# Patient Record
Sex: MALE | Race: WHITE | NOT HISPANIC OR LATINO | URBAN - METROPOLITAN AREA
[De-identification: names, ages, dates, MRNs, and addresses within clinical notes are randomized per-mention and may not be internally consistent; named-entity substitution may affect disease eponyms.]

---

## 2017-11-20 ENCOUNTER — OUTPATIENT (OUTPATIENT)
Dept: OUTPATIENT SERVICES | Facility: HOSPITAL | Age: 67
LOS: 1 days | End: 2017-11-20
Payer: MEDICARE

## 2017-11-20 VITALS
OXYGEN SATURATION: 96 % | HEART RATE: 78 BPM | WEIGHT: 250 LBS | RESPIRATION RATE: 18 BRPM | SYSTOLIC BLOOD PRESSURE: 155 MMHG | DIASTOLIC BLOOD PRESSURE: 93 MMHG | HEIGHT: 68 IN | TEMPERATURE: 98 F

## 2017-11-20 DIAGNOSIS — R94.39 ABNORMAL RESULT OF OTHER CARDIOVASCULAR FUNCTION STUDY: ICD-10-CM

## 2017-11-20 DIAGNOSIS — Z90.89 ACQUIRED ABSENCE OF OTHER ORGANS: Chronic | ICD-10-CM

## 2017-11-20 DIAGNOSIS — Z01.810 ENCOUNTER FOR PREPROCEDURAL CARDIOVASCULAR EXAMINATION: ICD-10-CM

## 2017-11-20 LAB
ALBUMIN SERPL ELPH-MCNC: 4.2 G/DL — SIGNIFICANT CHANGE UP (ref 3.3–5.2)
ALP SERPL-CCNC: 84 U/L — SIGNIFICANT CHANGE UP (ref 40–120)
ALT FLD-CCNC: 28 U/L — SIGNIFICANT CHANGE UP
ANION GAP SERPL CALC-SCNC: 14 MMOL/L — SIGNIFICANT CHANGE UP (ref 5–17)
APTT BLD: 29.2 SEC — SIGNIFICANT CHANGE UP (ref 27.5–37.4)
AST SERPL-CCNC: 30 U/L — SIGNIFICANT CHANGE UP
BILIRUB SERPL-MCNC: 0.4 MG/DL — SIGNIFICANT CHANGE UP (ref 0.4–2)
BUN SERPL-MCNC: 13 MG/DL — SIGNIFICANT CHANGE UP (ref 8–20)
CALCIUM SERPL-MCNC: 9.4 MG/DL — SIGNIFICANT CHANGE UP (ref 8.6–10.2)
CHLORIDE SERPL-SCNC: 102 MMOL/L — SIGNIFICANT CHANGE UP (ref 98–107)
CO2 SERPL-SCNC: 25 MMOL/L — SIGNIFICANT CHANGE UP (ref 22–29)
CREAT SERPL-MCNC: 1.05 MG/DL — SIGNIFICANT CHANGE UP (ref 0.5–1.3)
GLUCOSE SERPL-MCNC: 107 MG/DL — SIGNIFICANT CHANGE UP (ref 70–115)
HCT VFR BLD CALC: 46.2 % — SIGNIFICANT CHANGE UP (ref 42–52)
HCV AB S/CO SERPL IA: 0.28 S/CO — SIGNIFICANT CHANGE UP
HCV AB SERPL-IMP: SIGNIFICANT CHANGE UP
HGB BLD-MCNC: 15.4 G/DL — SIGNIFICANT CHANGE UP (ref 14–18)
HIV 1 & 2 AB SERPL IA.RAPID: SIGNIFICANT CHANGE UP
INR BLD: 0.96 RATIO — SIGNIFICANT CHANGE UP (ref 0.88–1.16)
MCHC RBC-ENTMCNC: 29.4 PG — SIGNIFICANT CHANGE UP (ref 27–31)
MCHC RBC-ENTMCNC: 33.3 G/DL — SIGNIFICANT CHANGE UP (ref 32–36)
MCV RBC AUTO: 88.2 FL — SIGNIFICANT CHANGE UP (ref 80–94)
PLATELET # BLD AUTO: 261 K/UL — SIGNIFICANT CHANGE UP (ref 150–400)
POTASSIUM SERPL-MCNC: 4.2 MMOL/L — SIGNIFICANT CHANGE UP (ref 3.5–5.3)
POTASSIUM SERPL-SCNC: 4.2 MMOL/L — SIGNIFICANT CHANGE UP (ref 3.5–5.3)
PROT SERPL-MCNC: 7.4 G/DL — SIGNIFICANT CHANGE UP (ref 6.6–8.7)
PROTHROM AB SERPL-ACNC: 10.6 SEC — SIGNIFICANT CHANGE UP (ref 9.8–12.7)
RBC # BLD: 5.24 M/UL — SIGNIFICANT CHANGE UP (ref 4.6–6.2)
RBC # FLD: 13.8 % — SIGNIFICANT CHANGE UP (ref 11–15.6)
SODIUM SERPL-SCNC: 141 MMOL/L — SIGNIFICANT CHANGE UP (ref 135–145)
WBC # BLD: 7.6 K/UL — SIGNIFICANT CHANGE UP (ref 4.8–10.8)
WBC # FLD AUTO: 7.6 K/UL — SIGNIFICANT CHANGE UP (ref 4.8–10.8)

## 2017-11-20 PROCEDURE — 93010 ELECTROCARDIOGRAM REPORT: CPT

## 2017-11-20 NOTE — ASU PATIENT PROFILE, ADULT - PSH
Deviated septum  repaired surgically  H/O left inguinal hernia repair    H/O right knee surgery    H/O umbilical hernia repair    History of cataract surgery    History of tonsillectomy    S/P angioplasty with stent  LAD

## 2017-11-20 NOTE — H&P PST ADULT - PMH
Abnormal stress test    CAD (coronary artery disease)    Detached retina, bilateral  repaired  Diverticulosis    GERD (gastroesophageal reflux disease)  pharyngeal reflux, gastro-esophageal reflux  without esophagitis  Glaucoma  open angle  Hiatal hernia    HLD (hyperlipidemia)    Skin cancer    Sleep apnea  on  cpap  setting  15 Abnormal stress test    Asthma    CAD (coronary artery disease)    Detached retina, bilateral  repaired  Diverticulosis    GERD (gastroesophageal reflux disease)  pharyngeal reflux, gastro-esophageal reflux  without esophagitis  Glaucoma  open angle  Hiatal hernia    HLD (hyperlipidemia)    Skin cancer    Sleep apnea  on  cpap  setting  15

## 2017-11-20 NOTE — H&P PST ADULT - HISTORY OF PRESENT ILLNESS
67 year old male with h/o HTN, HLD and CAD s/p PAD stent 2005 and again in 2008 after occlusion.  Repeat cardiac cath in 2009 revealed patent stent with 80-90% diagonal branch occlusion treated medically.  Pt with c/o CESAR with positive stress showing severe anterior and apical ischemia.

## 2017-11-20 NOTE — ASU PATIENT PROFILE, ADULT - PMH
CAD (coronary artery disease)    Detached retina, bilateral  repaired  Diverticulosis    GERD (gastroesophageal reflux disease)  pharyngeal reflux, gastro-esophageal reflux  without esophagitis  Glaucoma  open angle  Hiatal hernia    HLD (hyperlipidemia)    Skin cancer    Sleep apnea  on  cpap  setting  15

## 2017-11-21 ENCOUNTER — INPATIENT (INPATIENT)
Facility: HOSPITAL | Age: 67
LOS: 0 days | Discharge: HOME | DRG: 247 | End: 2017-11-22
Attending: EMERGENCY MEDICINE | Admitting: EMERGENCY MEDICINE
Payer: COMMERCIAL

## 2017-11-21 ENCOUNTER — TRANSCRIPTION ENCOUNTER (OUTPATIENT)
Age: 67
End: 2017-11-21

## 2017-11-21 VITALS
TEMPERATURE: 98 F | OXYGEN SATURATION: 96 % | HEART RATE: 95 BPM | RESPIRATION RATE: 18 BRPM | SYSTOLIC BLOOD PRESSURE: 161 MMHG | DIASTOLIC BLOOD PRESSURE: 80 MMHG

## 2017-11-21 DIAGNOSIS — Z01.810 ENCOUNTER FOR PREPROCEDURAL CARDIOVASCULAR EXAMINATION: ICD-10-CM

## 2017-11-21 DIAGNOSIS — Z90.89 ACQUIRED ABSENCE OF OTHER ORGANS: Chronic | ICD-10-CM

## 2017-11-21 DIAGNOSIS — I25.10 ATHEROSCLEROTIC HEART DISEASE OF NATIVE CORONARY ARTERY WITHOUT ANGINA PECTORIS: ICD-10-CM

## 2017-11-21 DIAGNOSIS — R94.39 ABNORMAL RESULT OF OTHER CARDIOVASCULAR FUNCTION STUDY: ICD-10-CM

## 2017-11-21 LAB
BLD GP AB SCN SERPL QL: SIGNIFICANT CHANGE UP
TYPE + AB SCN PNL BLD: SIGNIFICANT CHANGE UP

## 2017-11-21 RX ORDER — TAMSULOSIN HYDROCHLORIDE 0.4 MG/1
0.4 CAPSULE ORAL AT BEDTIME
Qty: 0 | Refills: 0 | Status: DISCONTINUED | OUTPATIENT
Start: 2017-11-21 | End: 2017-11-22

## 2017-11-21 RX ORDER — LEVALBUTEROL 1.25 MG/.5ML
3 SOLUTION, CONCENTRATE RESPIRATORY (INHALATION)
Qty: 0 | Refills: 0 | COMMUNITY

## 2017-11-21 RX ORDER — SODIUM CHLORIDE 9 MG/ML
1000 INJECTION INTRAMUSCULAR; INTRAVENOUS; SUBCUTANEOUS
Qty: 0 | Refills: 0 | Status: DISCONTINUED | OUTPATIENT
Start: 2017-11-21 | End: 2017-11-21

## 2017-11-21 RX ORDER — CICLESONIDE 37 UG/1
1 AEROSOL, METERED NASAL
Qty: 0 | Refills: 0 | COMMUNITY

## 2017-11-21 RX ORDER — ONDANSETRON 8 MG/1
4 TABLET, FILM COATED ORAL EVERY 8 HOURS
Qty: 0 | Refills: 0 | Status: DISCONTINUED | OUTPATIENT
Start: 2017-11-21 | End: 2017-11-21

## 2017-11-21 RX ORDER — TIOTROPIUM BROMIDE 18 UG/1
1 CAPSULE ORAL; RESPIRATORY (INHALATION) DAILY
Qty: 0 | Refills: 0 | Status: DISCONTINUED | OUTPATIENT
Start: 2017-11-21 | End: 2017-11-22

## 2017-11-21 RX ORDER — PRASUGREL 5 MG/1
10 TABLET, FILM COATED ORAL DAILY
Qty: 0 | Refills: 0 | Status: DISCONTINUED | OUTPATIENT
Start: 2017-11-22 | End: 2017-11-22

## 2017-11-21 RX ORDER — ASPIRIN/CALCIUM CARB/MAGNESIUM 324 MG
325 TABLET ORAL DAILY
Qty: 0 | Refills: 0 | Status: DISCONTINUED | OUTPATIENT
Start: 2017-11-21 | End: 2017-11-21

## 2017-11-21 RX ORDER — ALBUTEROL 90 UG/1
2 AEROSOL, METERED ORAL
Qty: 0 | Refills: 0 | COMMUNITY

## 2017-11-21 RX ORDER — TIOTROPIUM BROMIDE 18 UG/1
1 CAPSULE ORAL; RESPIRATORY (INHALATION)
Qty: 0 | Refills: 0 | COMMUNITY

## 2017-11-21 RX ORDER — ZOLPIDEM TARTRATE 10 MG/1
5 TABLET ORAL AT BEDTIME
Qty: 0 | Refills: 0 | Status: DISCONTINUED | OUTPATIENT
Start: 2017-11-21 | End: 2017-11-22

## 2017-11-21 RX ORDER — ASPIRIN/CALCIUM CARB/MAGNESIUM 324 MG
1 TABLET ORAL
Qty: 0 | Refills: 0 | COMMUNITY

## 2017-11-21 RX ORDER — LEVOCETIRIZINE DIHYDROCHLORIDE 0.5 MG/ML
1 SOLUTION ORAL
Qty: 0 | Refills: 0 | COMMUNITY

## 2017-11-21 RX ORDER — ACETAMINOPHEN 500 MG
650 TABLET ORAL EVERY 6 HOURS
Qty: 0 | Refills: 0 | Status: DISCONTINUED | OUTPATIENT
Start: 2017-11-21 | End: 2017-11-22

## 2017-11-21 RX ORDER — ATORVASTATIN CALCIUM 80 MG/1
80 TABLET, FILM COATED ORAL AT BEDTIME
Qty: 0 | Refills: 0 | Status: DISCONTINUED | OUTPATIENT
Start: 2017-11-21 | End: 2017-11-22

## 2017-11-21 RX ORDER — ASCORBIC ACID 60 MG
500 TABLET,CHEWABLE ORAL DAILY
Qty: 0 | Refills: 0 | Status: DISCONTINUED | OUTPATIENT
Start: 2017-11-21 | End: 2017-11-22

## 2017-11-21 RX ORDER — CLOPIDOGREL BISULFATE 75 MG/1
75 TABLET, FILM COATED ORAL DAILY
Qty: 0 | Refills: 0 | Status: DISCONTINUED | OUTPATIENT
Start: 2017-11-21 | End: 2017-11-21

## 2017-11-21 RX ORDER — TAMSULOSIN HYDROCHLORIDE 0.4 MG/1
2 CAPSULE ORAL
Qty: 0 | Refills: 0 | COMMUNITY

## 2017-11-21 RX ORDER — L.ACIDOPH/B.ANIMALIS/B.LONGUM 15B CELL
1 CAPSULE ORAL
Qty: 0 | Refills: 0 | COMMUNITY

## 2017-11-21 RX ORDER — RABEPRAZOLE 20 MG/1
1 TABLET, DELAYED RELEASE ORAL
Qty: 0 | Refills: 0 | COMMUNITY

## 2017-11-21 RX ORDER — ASCORBIC ACID 60 MG
1 TABLET,CHEWABLE ORAL
Qty: 0 | Refills: 0 | COMMUNITY

## 2017-11-21 RX ORDER — SODIUM CHLORIDE 9 MG/ML
700 INJECTION INTRAMUSCULAR; INTRAVENOUS; SUBCUTANEOUS
Qty: 0 | Refills: 0 | Status: DISCONTINUED | OUTPATIENT
Start: 2017-11-21 | End: 2017-11-22

## 2017-11-21 RX ORDER — ONDANSETRON 8 MG/1
4 TABLET, FILM COATED ORAL EVERY 8 HOURS
Qty: 0 | Refills: 0 | Status: DISCONTINUED | OUTPATIENT
Start: 2017-11-21 | End: 2017-11-22

## 2017-11-21 RX ORDER — ALBUTEROL 90 UG/1
2 AEROSOL, METERED ORAL EVERY 6 HOURS
Qty: 0 | Refills: 0 | Status: DISCONTINUED | OUTPATIENT
Start: 2017-11-21 | End: 2017-11-22

## 2017-11-21 RX ORDER — ASPIRIN/CALCIUM CARB/MAGNESIUM 324 MG
81 TABLET ORAL DAILY
Qty: 0 | Refills: 0 | Status: DISCONTINUED | OUTPATIENT
Start: 2017-11-22 | End: 2017-11-22

## 2017-11-21 RX ORDER — ALPRAZOLAM 0.25 MG
0.25 TABLET ORAL EVERY 6 HOURS
Qty: 0 | Refills: 0 | Status: DISCONTINUED | OUTPATIENT
Start: 2017-11-21 | End: 2017-11-22

## 2017-11-21 RX ORDER — DUTASTERIDE 0.5 MG/1
1 CAPSULE, LIQUID FILLED ORAL
Qty: 0 | Refills: 0 | COMMUNITY

## 2017-11-21 RX ORDER — IPRATROPIUM BROMIDE 0.2 MG/ML
2 SOLUTION, NON-ORAL INHALATION
Qty: 0 | Refills: 0 | COMMUNITY

## 2017-11-21 RX ORDER — ROSUVASTATIN CALCIUM 5 MG/1
1 TABLET ORAL
Qty: 0 | Refills: 0 | COMMUNITY

## 2017-11-21 RX ORDER — PANTOPRAZOLE SODIUM 20 MG/1
40 TABLET, DELAYED RELEASE ORAL
Qty: 0 | Refills: 0 | Status: DISCONTINUED | OUTPATIENT
Start: 2017-11-21 | End: 2017-11-22

## 2017-11-21 RX ORDER — FLUTICASONE PROPIONATE 220 MCG
1 AEROSOL WITH ADAPTER (GRAM) INHALATION
Qty: 0 | Refills: 0 | COMMUNITY

## 2017-11-21 RX ORDER — CHOLECALCIFEROL (VITAMIN D3) 125 MCG
1 CAPSULE ORAL
Qty: 0 | Refills: 0 | COMMUNITY

## 2017-11-21 RX ADMIN — SODIUM CHLORIDE 100 MILLILITER(S): 9 INJECTION INTRAMUSCULAR; INTRAVENOUS; SUBCUTANEOUS at 13:35

## 2017-11-21 RX ADMIN — Medication 650 MILLIGRAM(S): at 18:22

## 2017-11-21 RX ADMIN — ATORVASTATIN CALCIUM 80 MILLIGRAM(S): 80 TABLET, FILM COATED ORAL at 21:50

## 2017-11-21 RX ADMIN — TAMSULOSIN HYDROCHLORIDE 0.4 MILLIGRAM(S): 0.4 CAPSULE ORAL at 21:50

## 2017-11-21 RX ADMIN — Medication 650 MILLIGRAM(S): at 16:27

## 2017-11-21 NOTE — DISCHARGE NOTE ADULT - PATIENT PORTAL LINK FT
“You can access the Follow[facility] Patient Portal, offered by [facility], by registering with the following website: [email or web address]”

## 2017-11-21 NOTE — PROGRESS NOTE ADULT - SUBJECTIVE AND OBJECTIVE BOX
s/p LHC RFA w/perclose  IVUS with ANNY x3 onynx PCI to LAD w/laser atherectomy  POBA D1  Tolerated procedure well w/o complications  Seen post procedure by Dr. Navarrete with wife present  D/W pt change to effient 10mg and aspirin 81 mg po daily          REVIEW OF SYSTEMS:  Denies SOB, CP, NV, HA, dizziness, palpitations, site pain    PHYSICAL EXAM: A&Ox3 NAD Skin warm and dry  NEURO: Speech intact +gag +swallow Tongue midline KILLIAN  NECK: No JVD, trachea midline. Eupneic  HEART: RRR S1S2 no g/m Tele/ECG NSR  PULMONARY:  CTA josef  ABDOMEN: Soft nontender X4 +BS Vdg/eating  EXTREMITIES: RFA site: No bleed, hematoma, pain, ecchymosis or swelling Rt DP/PT+

## 2017-11-21 NOTE — PROGRESS NOTE ADULT - PROBLEM SELECTOR PLAN 1
AM ECG/labs  D/C plavix/  Start Effient 10/ASA 81  No BP meds per Dr. Navarrete  Right groin care d/w patient as well as weight loss, diet, exercise for optimal cardiac function  FU Dr. Navarrete

## 2017-11-21 NOTE — PROGRESS NOTE ADULT - ASSESSMENT
A-s/p LHC RFA w/perclose  IVUS w/laser atherectomy and PCI ANNY x3 resolute pedro to p/m LAD  POBA D1

## 2017-11-21 NOTE — DISCHARGE NOTE ADULT - MEDICATION SUMMARY - MEDICATIONS TO TAKE
I will START or STAY ON the medications listed below when I get home from the hospital:    vitamin b complex  -- 1 tab(s) by mouth once a day  -- Indication: For vitamin    men's one a day multivitamin  -- 1 tab(s) by mouth once a day  -- Indication: For vitamin    Avodart 0.5 mg oral capsule  -- 1 cap(s) by mouth once a day  -- Indication: For prostate    Revatio 20 mg oral tablet  -- 5 tab(s) by mouth once a day, As Needed  -- Indication: For heart    aspirin 81 mg oral tablet  -- 1 tab(s) by mouth once a day  -- Indication: For aspirin    Flomax 0.4 mg oral capsule  -- 2 cap(s) by mouth once a day  -- Indication: For prostate    Xyzal 5 mg oral tablet  -- 1 tab(s) by mouth once a day (in the evening)  -- Indication: For antihistamine    Crestor 40 mg oral tablet  -- 1 tab(s) by mouth once a day (at bedtime)  -- Indication: For Cholesterol    prasugrel 10 mg oral tablet  -- 1 tab(s) by mouth once a day  -- Indication: For anti platelet    Spiriva 18 mcg inhalation capsule  -- 1 cap(s) inhaled once a day  -- Indication: For lungs    Ventolin HFA 90 mcg/inh inhalation aerosol  -- 2 puff(s) inhaled 4 times a day, As Needed  -- Indication: For lungs    Atrovent HFA 17 mcg/inh inhalation aerosol  -- 2 puff(s) inhaled 4 times a day, As Needed  -- Indication: For lungs    levalbuterol 1.25 mg/3 mL inhalation solution  -- 3 milliliter(s) inhaled 2 times a day  -- Indication: For lungs    Zetonna 37 mcg/inh nasal aerosol  -- 1 spray(s) into nose once a day  -- Indication: For nose    Probiotic Formula oral capsule  -- 1 cap(s) by mouth once a day  -- Indication: For vitamin    AcipHex 20 mg oral delayed release tablet  -- 1 tab(s) by mouth once a day  -- Indication: For stomach    Arnuity Ellipta furoate 200 mcg inhalation powder  -- 1 puff(s) inhaled every 24 hours  -- Indication: For lung    Vitamin C 500 mg oral tablet  -- 1 tab(s) by mouth once a day  -- Indication: For vitamin    Vitamin D3 2000 intl units oral tablet  -- 1 tab(s) by mouth once a day  -- Indication: For vitamin

## 2017-11-21 NOTE — DISCHARGE NOTE ADULT - HOSPITAL COURSE
s/p LHC RFA w/perclose  IVUS with ANNY x3 onynx PCI to LAD  POBA D1 s/p LHC RFA w/perclose  IVUS with ANNY x3 onynx PCI to LAD after laser atherectomy  POBA D1 s/p LHC RFA w/perclose  IVUS with ANNY x3 onynx PCI to LAD after laser atherectomy  POBA D1 without complications during hospital course. Groin site benign, patient without complaints overnight. Stable for discharge.

## 2017-11-21 NOTE — DISCHARGE NOTE ADULT - INSTRUCTIONS
Activities as tolerated minimize stair climbing, heavy lifting greater than 10lbs, strenous house work, contact sports,No intercourse for 1 week. Site care no Bath tubs, Hot tubs , Pools for 1 week. Monitor site for infection such as warmth drainage or swelling of site. Monitor for bleeding call MD if continous bleeding occurs hold pressure report to nearest ER, Do not opperate heavy machinery or drive for 24hours.  REMOVE RIGHT GROIN DRESSING WITHIN 24 HOURS

## 2017-11-21 NOTE — DISCHARGE NOTE ADULT - CARE PROVIDER_API CALL
Juan Diego Navarrete), Cardiovascular Disease; Interventional Cardiology  [address]  [address]  [place] [address]  Phone: [phone]  Fax: [phone]

## 2017-11-21 NOTE — DISCHARGE NOTE ADULT - CARE PLAN
Principal Discharge DX:	CAD (coronary artery disease)  Goal:	optimal cardiac function  Instructions for follow-up, activity and diet:	Choose lean meats and poultry without skin and prepare them without added saturated and trans fat.  Eat fish at least twice a week. Recent research shows that eating oily fish containing omega-3 fatty acids (for example, salmon, trout and herring) may help lower your risk of death from coronary artery disease.  Select fat-free, 1 percent fat and low-fat dairy products.  Cut back on foods containing partially hydrogenated vegetable oils to reduce trans fat in your diet.   To lower cholesterol, reduce saturated fat to no more than 5 to 6 percent of total calories. For someone eating 2,000 calories a day, that’s about 13 grams of saturated fat.  Cut back on beverages and foods with added sugars.  Choose and prepare foods with little or no salt. To lower blood pressure, aim to eat no more than 2,400 milligrams of sodium per day. Reducing daily intake to 1,500 mg is desirable because it can lower blood pressure even further.  If you drink alcohol, drink in moderation. That means one drink per day if you’re a woman and two drinks  per day if you’re a man.  Follow the American Heart Association recommendations when you eat out, and keep an eye on your portion sizes.

## 2017-11-22 VITALS
RESPIRATION RATE: 14 BRPM | DIASTOLIC BLOOD PRESSURE: 76 MMHG | OXYGEN SATURATION: 97 % | TEMPERATURE: 98 F | HEART RATE: 72 BPM | SYSTOLIC BLOOD PRESSURE: 113 MMHG

## 2017-11-22 LAB
ANION GAP SERPL CALC-SCNC: 11 MMOL/L — SIGNIFICANT CHANGE UP (ref 5–17)
BUN SERPL-MCNC: 15 MG/DL — SIGNIFICANT CHANGE UP (ref 8–20)
CALCIUM SERPL-MCNC: 8.8 MG/DL — SIGNIFICANT CHANGE UP (ref 8.6–10.2)
CHLORIDE SERPL-SCNC: 103 MMOL/L — SIGNIFICANT CHANGE UP (ref 98–107)
CO2 SERPL-SCNC: 25 MMOL/L — SIGNIFICANT CHANGE UP (ref 22–29)
CREAT SERPL-MCNC: 1.06 MG/DL — SIGNIFICANT CHANGE UP (ref 0.5–1.3)
GLUCOSE SERPL-MCNC: 105 MG/DL — SIGNIFICANT CHANGE UP (ref 70–115)
HCT VFR BLD CALC: 43.2 % — SIGNIFICANT CHANGE UP (ref 42–52)
HGB BLD-MCNC: 14.6 G/DL — SIGNIFICANT CHANGE UP (ref 14–18)
MAGNESIUM SERPL-MCNC: 2.2 MG/DL — SIGNIFICANT CHANGE UP (ref 1.6–2.6)
MCHC RBC-ENTMCNC: 30.2 PG — SIGNIFICANT CHANGE UP (ref 27–31)
MCHC RBC-ENTMCNC: 33.8 G/DL — SIGNIFICANT CHANGE UP (ref 32–36)
MCV RBC AUTO: 89.3 FL — SIGNIFICANT CHANGE UP (ref 80–94)
PLATELET # BLD AUTO: 235 K/UL — SIGNIFICANT CHANGE UP (ref 150–400)
POTASSIUM SERPL-MCNC: 4.2 MMOL/L — SIGNIFICANT CHANGE UP (ref 3.5–5.3)
POTASSIUM SERPL-SCNC: 4.2 MMOL/L — SIGNIFICANT CHANGE UP (ref 3.5–5.3)
RBC # BLD: 4.84 M/UL — SIGNIFICANT CHANGE UP (ref 4.6–6.2)
RBC # FLD: 14.1 % — SIGNIFICANT CHANGE UP (ref 11–15.6)
SODIUM SERPL-SCNC: 139 MMOL/L — SIGNIFICANT CHANGE UP (ref 135–145)
WBC # BLD: 9 K/UL — SIGNIFICANT CHANGE UP (ref 4.8–10.8)
WBC # FLD AUTO: 9 K/UL — SIGNIFICANT CHANGE UP (ref 4.8–10.8)

## 2017-11-22 PROCEDURE — 93010 ELECTROCARDIOGRAM REPORT: CPT

## 2017-11-22 RX ORDER — CLOPIDOGREL BISULFATE 75 MG/1
1 TABLET, FILM COATED ORAL
Qty: 0 | Refills: 0 | COMMUNITY

## 2017-11-22 RX ORDER — PRASUGREL 5 MG/1
1 TABLET, FILM COATED ORAL
Qty: 180 | Refills: 0 | OUTPATIENT
Start: 2017-11-22

## 2017-11-22 RX ADMIN — PANTOPRAZOLE SODIUM 40 MILLIGRAM(S): 20 TABLET, DELAYED RELEASE ORAL at 06:23

## 2017-11-22 RX ADMIN — TIOTROPIUM BROMIDE 1 CAPSULE(S): 18 CAPSULE ORAL; RESPIRATORY (INHALATION) at 06:26

## 2017-11-22 RX ADMIN — Medication 81 MILLIGRAM(S): at 08:28

## 2017-11-22 RX ADMIN — PRASUGREL 10 MILLIGRAM(S): 5 TABLET, FILM COATED ORAL at 08:28

## 2017-11-22 NOTE — PROGRESS NOTE ADULT - SUBJECTIVE AND OBJECTIVE BOX
Subjective:  67y  Male S/P LHC RFA w/perclose, IVUS with ANNY x3 pedro PCI to LAD w/laser atherectomy, POBA D1. Patient without complaints overnight, ambulating to bathroom without complaints. Denies chest pain, sob, palps. Right groin site benign.    < from: Cardiac Cath Lab - Adult (.17 @ 11:00) >  VENTRICLES: There were no left ventricular global or regional wall motion  abnormalities. Global left ventricular function was normal. EF estimated  was 65 %.  VALVES: MITRAL VALVE: The mitral valve exhibited trivial regurgitation  (less than 1+).  CORONARY VESSELS: The coronary circulation is right dominant.  LM:   --  LM: Normal.  LAD:   --  Proximal LAD: There was a tubular 70 % stenosis.  --  Mid LAD: There was a tubular 100 % stenosis at the siteof a prior  stent.  --  D1: There was a 90 % stenosis.  CX:   --  Ostial circumflex: There was a discrete 50 % stenosis.  RI:   --  Proximal ramus intermedius: There was a tubular 25 % stenosis.  RCA:   --  Mid RCA: There was a tubular 20 % stenosis.  COMPLICATIONS: No complications occurred during the cath lab visit. No  complications occurred during the cath lab visit.  SUMMARY:  1ST LESION INTERVENTIONS: A successful drug-eluting stent with laser  atherectomy was performed on the 100 % lesion in the mid LAD. Following  intervention there was an excellent angiographic appearance with a 0 %  residual stenosis.  2ND LESION INTERVENTIONS: A successful balloon angioplasty was performed on  the 90 % lesion in the 1st diagonal. Following intervention there was an  excellent angiographic appearance with a 40 % residual stenosis.  3RD LESION INTERVENTIONS: A successful drug-eluting stent was performed on  the 70 % lesion in the proximal LAD. Following intervention there was an  excellent angiographic appearance with a 0 % residual stenosis.  DIAGNOSTIC RECOMMENDATIONS: Aspirin 81mg and Effient 10mg daily.  INTERVENTIONAL RECOMMENDATIONS: Aspirin 81mg and Effient 10mg daily.  Prepared and signed by  Juan Diego Navarrete MD    < end of copied text >        PAST MEDICAL & SURGICAL HISTORY:  Asthma  Abnormal stress test  Detached retina, bilateral: repaired  Glaucoma: open angle  Diverticulosis  GERD (gastroesophageal reflux disease): pharyngeal reflux, gastro-esophageal reflux  without esophagitis  Hiatal hernia  Skin cancer  HLD (hyperlipidemia)  Sleep apnea: on  cpap  setting  15  CAD (coronary artery disease)  History of tonsillectomy  S/P angioplasty with stent: LAD  H/O right knee surgery  H/O umbilical hernia repair  H/O left inguinal hernia repair  History of cataract surgery  Deviated septum: repaired surgically    FAMILY HISTORY:  Family history of heart disease    MEDICATIONS  (STANDING):  ascorbic acid 500 milliGRAM(s) Oral daily  aspirin  chewable 81 milliGRAM(s) Oral daily  atorvastatin 80 milliGRAM(s) Oral at bedtime  pantoprazole    Tablet 40 milliGRAM(s) Oral before breakfast  prasugrel 10 milliGRAM(s) Oral daily  sodium chloride 0.9%. 700 milliLiter(s) (100 mL/Hr) IV Continuous <Continuous>  tamsulosin 0.4 milliGRAM(s) Oral at bedtime  tiotropium 18 MICROgram(s) Capsule 1 Capsule(s) Inhalation daily          General: No fatigue, no fevers/chills  Respiratory: No dyspnea, no cough, no wheeze  CV: No chest pain, no palpitations  Abd: No nausea  Neuro: No headache, no dizziness  sulfa drugs (Rash)      Objective:  Vital Signs Last 24 Hrs  T(C): 36.8 (2017 06:21), Max: 36.8 (2017 08:35)  T(F): 98.2 (2017 06:21), Max: 98.2 (2017 08:35)  HR: 72 (2017 06:21) (66 - 95)  BP: 113/76 (2017 06:21) (113/76 - 161/80)  BP(mean): 84 (2017 06:21) (84 - 94)  RR: 14 (2017 06:21) (14 - 18)  SpO2: 97% (2017 06:21) (96% - 100%)    CM: SR  Neuro: A&OX3  Lungs: CTA B/L  CV: S1, S2, no murmur, RRR  Abd: Soft  Right Groin: Soft, no bleeding, no hematoma  Extremity: + distal pulses  EK.6   9.0   )-----------( 235      ( 2017 06:26 )             43.2         139  |  103  |  15.0  ----------------------------<  105  4.2   |  25.0  |  1.06    Ca    8.8      2017 06:26  Mg     2.2     -    TPro  7.4  /  Alb  4.2  /  TBili  0.4  /  DBili  x   /  AST  30  /  ALT  28  /  AlkPhos  84  11-20    PT/INR - ( 2017 10:48 )   PT: 10.6 sec;   INR: 0.96 ratio         PTT - ( 2017 10:48 )  PTT:29.2 sec    A/P: CAD s/p ANNY x 3 to LAD, POBA to D1  1.                 Groin management discussed with patient.  2.                 AM procedure labs/EKG reviewed and stable.    3.                 Pt given instructions on importance of taking antiplatelet medication.    4.                 Aspirin/Effient/Statin  5.                 Follow up with cardiologist in 2 weeks or sooner if needed  6.                 Lifestyle modification including diet and exercise discussed with patient  7.                 Stable for discharge

## 2017-12-19 PROCEDURE — C1753: CPT

## 2017-12-19 PROCEDURE — C9602: CPT

## 2017-12-19 PROCEDURE — 80053 COMPREHEN METABOLIC PANEL: CPT

## 2017-12-19 PROCEDURE — 92978 ENDOLUMINL IVUS OCT C 1ST: CPT | Mod: LD

## 2017-12-19 PROCEDURE — C1725: CPT

## 2017-12-19 PROCEDURE — C1769: CPT

## 2017-12-19 PROCEDURE — 80048 BASIC METABOLIC PNL TOTAL CA: CPT

## 2017-12-19 PROCEDURE — 86900 BLOOD TYPING SEROLOGIC ABO: CPT

## 2017-12-19 PROCEDURE — C1887: CPT

## 2017-12-19 PROCEDURE — 86803 HEPATITIS C AB TEST: CPT

## 2017-12-19 PROCEDURE — 86850 RBC ANTIBODY SCREEN: CPT

## 2017-12-19 PROCEDURE — 85730 THROMBOPLASTIN TIME PARTIAL: CPT

## 2017-12-19 PROCEDURE — C1894: CPT

## 2017-12-19 PROCEDURE — 36415 COLL VENOUS BLD VENIPUNCTURE: CPT

## 2017-12-19 PROCEDURE — C1760: CPT

## 2017-12-19 PROCEDURE — 99152 MOD SED SAME PHYS/QHP 5/>YRS: CPT

## 2017-12-19 PROCEDURE — 86901 BLOOD TYPING SEROLOGIC RH(D): CPT

## 2017-12-19 PROCEDURE — 93458 L HRT ARTERY/VENTRICLE ANGIO: CPT

## 2017-12-19 PROCEDURE — 93005 ELECTROCARDIOGRAM TRACING: CPT

## 2017-12-19 PROCEDURE — 94640 AIRWAY INHALATION TREATMENT: CPT

## 2017-12-19 PROCEDURE — C9607: CPT | Mod: LD

## 2017-12-19 PROCEDURE — C9600: CPT

## 2017-12-19 PROCEDURE — 85027 COMPLETE CBC AUTOMATED: CPT

## 2017-12-19 PROCEDURE — 99153 MOD SED SAME PHYS/QHP EA: CPT

## 2017-12-19 PROCEDURE — C1885: CPT

## 2017-12-19 PROCEDURE — C1874: CPT

## 2017-12-19 PROCEDURE — 86703 HIV-1/HIV-2 1 RESULT ANTBDY: CPT

## 2017-12-19 PROCEDURE — 85610 PROTHROMBIN TIME: CPT

## 2017-12-19 PROCEDURE — 92921: CPT | Mod: LD

## 2017-12-19 PROCEDURE — G0463: CPT

## 2017-12-19 PROCEDURE — 83735 ASSAY OF MAGNESIUM: CPT

## 2018-07-16 PROBLEM — K21.9 GASTRO-ESOPHAGEAL REFLUX DISEASE WITHOUT ESOPHAGITIS: Chronic | Status: ACTIVE | Noted: 2017-11-20

## 2022-10-20 PROBLEM — K44.9 DIAPHRAGMATIC HERNIA WITHOUT OBSTRUCTION OR GANGRENE: Chronic | Status: ACTIVE | Noted: 2017-11-20

## 2022-10-20 PROBLEM — R94.39 ABNORMAL RESULT OF OTHER CARDIOVASCULAR FUNCTION STUDY: Chronic | Status: ACTIVE | Noted: 2017-11-20

## 2022-10-20 PROBLEM — J45.909 UNSPECIFIED ASTHMA, UNCOMPLICATED: Chronic | Status: ACTIVE | Noted: 2017-11-20

## 2022-10-20 PROBLEM — K57.90 DIVERTICULOSIS OF INTESTINE, PART UNSPECIFIED, WITHOUT PERFORATION OR ABSCESS WITHOUT BLEEDING: Chronic | Status: ACTIVE | Noted: 2017-11-20

## 2022-10-20 PROBLEM — H40.9 UNSPECIFIED GLAUCOMA: Chronic | Status: ACTIVE | Noted: 2017-11-20

## 2022-10-20 PROBLEM — H33.23 SEROUS RETINAL DETACHMENT, BILATERAL: Chronic | Status: ACTIVE | Noted: 2017-11-20

## 2022-11-04 ENCOUNTER — OFFICE (OUTPATIENT)
Dept: URBAN - METROPOLITAN AREA CLINIC 105 | Facility: CLINIC | Age: 72
Setting detail: OPHTHALMOLOGY
End: 2022-11-04
Payer: MEDICARE

## 2022-11-04 DIAGNOSIS — H01.011: ICD-10-CM

## 2022-11-04 DIAGNOSIS — E11.9: ICD-10-CM

## 2022-11-04 DIAGNOSIS — H35.3131: ICD-10-CM

## 2022-11-04 DIAGNOSIS — H40.1212: ICD-10-CM

## 2022-11-04 DIAGNOSIS — H01.012: ICD-10-CM

## 2022-11-04 DIAGNOSIS — H40.1222: ICD-10-CM

## 2022-11-04 DIAGNOSIS — H11.153: ICD-10-CM

## 2022-11-04 DIAGNOSIS — H33.003: ICD-10-CM

## 2022-11-04 DIAGNOSIS — H01.015: ICD-10-CM

## 2022-11-04 DIAGNOSIS — Z96.1: ICD-10-CM

## 2022-11-04 DIAGNOSIS — H01.014: ICD-10-CM

## 2022-11-04 PROCEDURE — 99213 OFFICE O/P EST LOW 20 MIN: CPT | Performed by: OPHTHALMOLOGY

## 2022-11-04 PROCEDURE — 92133 CPTRZD OPH DX IMG PST SGM ON: CPT | Performed by: OPHTHALMOLOGY

## 2022-11-04 ASSESSMENT — LID EXAM ASSESSMENTS
OS_MEIBOMITIS: LLL LUL 3+
OD_MEIBOMITIS: RLL RUL 3+

## 2022-11-04 ASSESSMENT — REFRACTION_CURRENTRX
OS_OVR_VA: 20/
OD_AXIS: 122
OS_CYLINDER: -1.00
OD_OVR_VA: 20/
OD_VPRISM_DIRECTION: PROGS
OS_AXIS: 095
OS_VPRISM_DIRECTION: PROGS
OS_ADD: +3.00
OS_SPHERE: PLANO
OD_SPHERE: +1.75
OD_CYLINDER: -0.50
OD_ADD: +3.00

## 2022-11-04 ASSESSMENT — PACHYMETRY
OS_CT_UM: 500
OD_CT_CORRECTION: 4
OS_CT_CORRECTION: 3
OD_CT_UM: 490

## 2022-11-04 ASSESSMENT — TONOMETRY: OS_IOP_MMHG: 10

## 2022-11-04 ASSESSMENT — REFRACTION_MANIFEST
OS_SPHERE: PLANO
OS_AXIS: 085
OS_VA1: 20/25
OD_SPHERE: +1.00
OS_CYLINDER: -1.25
OD_VA1: 20/25

## 2022-11-04 ASSESSMENT — KERATOMETRY
OD_K1POWER_DIOPTERS: 44.25
OD_AXISANGLE_DEGREES: 178
OS_K2POWER_DIOPTERS: 45.00
OS_AXISANGLE_DEGREES: 177
OD_K2POWER_DIOPTERS: 44.50
OS_K1POWER_DIOPTERS: 44.00

## 2022-11-04 ASSESSMENT — REFRACTION_AUTOREFRACTION
OD_AXIS: 080
OS_AXIS: 085
OS_SPHERE: +0.25
OD_CYLINDER: -0.75
OD_SPHERE: +1.25
OS_CYLINDER: -1.75

## 2022-11-04 ASSESSMENT — VISUAL ACUITY
OS_BCVA: 20/30-1
OD_BCVA: 20/20

## 2022-11-04 ASSESSMENT — AXIALLENGTH_DERIVED
OD_AL: 22.9477
OS_AL: 23.4687

## 2022-11-04 ASSESSMENT — SPHEQUIV_DERIVED
OS_SPHEQUIV: -0.625
OD_SPHEQUIV: 0.875

## 2022-11-04 ASSESSMENT — CONFRONTATIONAL VISUAL FIELD TEST (CVF)
OS_FINDINGS: FULL
OD_FINDINGS: FULL

## 2022-11-10 DIAGNOSIS — H40.9 UNSPECIFIED GLAUCOMA: ICD-10-CM

## 2022-11-10 DIAGNOSIS — E27.49 OTHER ADRENOCORTICAL INSUFFICIENCY: ICD-10-CM

## 2022-11-10 DIAGNOSIS — I10 ESSENTIAL (PRIMARY) HYPERTENSION: ICD-10-CM

## 2022-11-10 DIAGNOSIS — M19.90 UNSPECIFIED OSTEOARTHRITIS, UNSPECIFIED SITE: ICD-10-CM

## 2022-11-10 DIAGNOSIS — G47.30 SLEEP APNEA, UNSPECIFIED: ICD-10-CM

## 2022-11-10 DIAGNOSIS — E11.9 TYPE 2 DIABETES MELLITUS W/OUT COMPLICATIONS: ICD-10-CM

## 2022-11-10 DIAGNOSIS — K21.9 GASTRO-ESOPHAGEAL REFLUX DISEASE W/OUT ESOPHAGITIS: ICD-10-CM

## 2022-11-14 ENCOUNTER — APPOINTMENT (OUTPATIENT)
Dept: PEDIATRIC ALLERGY IMMUNOLOGY | Facility: CLINIC | Age: 72
End: 2022-11-14

## 2022-11-14 VITALS
BODY MASS INDEX: 35 KG/M2 | HEART RATE: 92 BPM | WEIGHT: 223 LBS | DIASTOLIC BLOOD PRESSURE: 76 MMHG | TEMPERATURE: 98.1 F | HEIGHT: 67 IN | OXYGEN SATURATION: 94 % | SYSTOLIC BLOOD PRESSURE: 118 MMHG

## 2022-11-14 PROCEDURE — 99213 OFFICE O/P EST LOW 20 MIN: CPT

## 2022-11-14 NOTE — HISTORY OF PRESENT ILLNESS
[de-identified] : Followed in the office for asthma and allergies for many years.  Received immunotherapy with extracts of grass, ragweed, cat, and dust mites for many years.  Immunotherapy was discontinued in December 2020.  He had asthma exacerbations in the past, consisting mostly from cough and chest congestion, requiring oral prednisone, more frequently until obstructive sleep apnea was diagnosed.  After starting to use CPAP, the chest symptoms improved.  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  Asthma was managed with inhaled corticosteroids and LAMA.  He had surgery for lung cancer, followed by metastases requiring adrenal gland removal.  He currently receives Keytruda for lung cancer, with good response.  The most recent CT of the chest, abdomen and pelvis showed stable disease.  He did have complications from Keytruda including pancreatitis, but he continued treatment.  He also developed rheumatoid arthritis possibly as a consequence of Keytruda, currently managed with Plaquenil.  He is on replacement for adrenal insufficiency, both with hydrocortisone and fludrocortisone.  He was diagnosed with diabetes and she is known to have hypertension.\par Using Arnuity 100, 1 puff daily.  Used Spiriva before, that was discontinued a month ago at the advice of the pulmonary physician.  He did not notice any increasing chest symptoms.  He gets occasional dry cough that improves after expelling thick white mucus, once or twice a week.  However he does not have wheezing or shortness of breath.  He had the most recent Moderna booster on October 12, 2022 and influenza vaccine on October 19, 2022.  He felt very tired after the Moderna vaccine.  He gets occasional fatigue and mild shortness of breath, but no need for use of albuterol.  He had 1 antibiotic course and prednisone in the summer for upper respiratory infection.  He is still able to perform woodwork and yard work, for which he wears a respirator. When the chest symptoms are severe, he uses levalbuterol 1.25 mg nebulizer.\par Allergies are managed now with Xyzal 5 mg daily.  He also uses Flonase and azelastine 0.1% 1 spray to each nostril twice a day.

## 2022-11-14 NOTE — REVIEW OF SYSTEMS
[Fatigue] : fatigue [Swollen Eyelids] : no ~T ~L swollen eyelids [Nasal Congestion] : nasal congestion [Cough] : cough [Urticaria] : no urticaria [Atopic Dermatitis] : no atopic dermatitis [Recurrent Sinus Infections] : no recurrent sinus infections [Recurrent Bronchitis] : recurrent bronchitis [Recurrent Pneumonia] : no ~T recurrent pneumonia

## 2022-11-14 NOTE — PHYSICAL EXAM
[Alert] : alert [No Acute Distress] : no acute distress [Normal Voice/Communication] : normal voice communication [Sclera Not Icteric] : sclera not icteric [Conjunctival Erythema] : conjunctival erythema [Suborbital Bogginess] : no suborbital bogginess (allergic shiners) [Supple] : the neck was supple [Soft] : abdomen soft [No clubbing] : no clubbing [No Edema] : no edema [No Cyanosis] : no cyanosis [de-identified] : Throat minimal erythema.  Neck mucosa red, perforated nasal septum with clean margins, mild stuffy nose, no discharge.  Tympanic membranes dull.  No oral thrush. [de-identified] : Chest good air entry, no wheezing or crackles. [de-identified] : S1-S2 regular, no murmurs.

## 2022-11-14 NOTE — ASSESSMENT
[FreeTextEntry1] : Allergic rhinitis (dust mites, pollen): Continue Xyzal, Flonase and azelastine nasal sprays.\par Asthma moderate persistent: Continue Arnuity 100, 1 puff daily.  Since he did not have increased chest symptoms Spiriva was stopped, he does not need to resume it.  Use rescue inhaler as needed.  Follow-up with pulmonary physician.  Will avoid LABA, due to possible tachyphylaxis in the past.\par Metastatic lung cancer: Good response to Keytruda.  He will continue the treatments under the care of the oncologist.\par Follow-up in 6 months if well or sooner if needed.

## 2023-04-18 ENCOUNTER — APPOINTMENT (OUTPATIENT)
Dept: PEDIATRIC ALLERGY IMMUNOLOGY | Facility: CLINIC | Age: 73
End: 2023-04-18
Payer: MEDICARE

## 2023-04-18 VITALS
HEART RATE: 80 BPM | TEMPERATURE: 98.1 F | DIASTOLIC BLOOD PRESSURE: 74 MMHG | SYSTOLIC BLOOD PRESSURE: 110 MMHG | OXYGEN SATURATION: 97 %

## 2023-04-18 PROCEDURE — 99213 OFFICE O/P EST LOW 20 MIN: CPT

## 2023-04-18 NOTE — PHYSICAL EXAM
[Alert] : alert [No Acute Distress] : no acute distress [Normal Voice/Communication] : normal voice communication [Supple] : the neck was supple [Soft] : abdomen soft [Skin Intact] : skin intact  [No clubbing] : no clubbing [No Cyanosis] : no cyanosis [Alert, Awake, Oriented as Age-Appropriate] : alert, awake, oriented as age appropriate [de-identified] : Eyes clear. [de-identified] : Throat clear.  Nasal mucosa pink, no stuffy nose, perforation of nasal septum with clean margins, no discharge.  Tympanic membranes normal.  No sinus tenderness. [de-identified] : Chest clear, good air entry, no wheezing or crackles. [de-identified] : S1-S2 regular, no murmurs.

## 2023-04-18 NOTE — HISTORY OF PRESENT ILLNESS
[de-identified] : In office for follow-up for allergic rhinitis.  Followed in our office for many years, received immunotherapy in the past with extracts of grass, ragweed, cat, and dust mites.  Immunotherapy was discontinued in December 2020.  He had good response to immunotherapy.  Currently allergies are managed with Xyzal 5 mg, Flonase and azelastine 0.1% 1 spray to each nostril twice daily.\par History of asthma with frequent exacerbations in the past requiring oral corticosteroids, consisting of cough and excessive mucus.  Much improved after diagnosed with sleep apnea and using CPAP at night.  Still has some cough with white mucus at times.  Due to possible tachyphylaxis to LABA, asthma was managed with Arnuity 100 and Spiriva.  Able to discontinue Spiriva several months ago, with no increase in symptoms.  Latest pulmonary function test was normal including DLCO.\par History of metastatic lung cancer, that required surgery and bilateral adrenalectomy for metastasis.  Received Keytruda for 2 years, with good response.  It was discontinued in December 2022 and the CT of the lung was stable in February.  Will have another 1 in a couple of weeks.  He had side effects from Keytruda: 1 episode of pancreatitis, possible onset of rheumatoid arthritis, skin rash and diarrhea alternating with constipation.  Since it was stopped, he has normal bowel movements.  Rheumatoid arthritis is treated with Plaquenil, had increased symptoms when the dose was decreased, which led to increase of the dose back to 200 mg twice daily.  Receives replacement after adrenalectomy with hydrocortisone and fludrocortisone.  Treated for diabetes, latest hemoglobin A1c 6.1.  BUN and creatinine are slightly elevated recently after trip to [place] when he did not drink enough fluids.  It will be repeated soon.  Medications: Norvasc, baby aspirin, Avodart, Flomax, Crestor, hydroxychloroquine, losartan, metformin, sildenafil, hydrochlorothiazide, Azopt eyedrops, hydrocortisone, fludrocortisone, potassium chloride, levalbuterol 1.25 mg as needed for nebulizer.  Supplements: Vitamin B complex, iron, vitamin D3, NAC, probiotic, collagen.

## 2023-04-18 NOTE — ASSESSMENT
[FreeTextEntry1] : Allergic rhinitis (dust mites, pollen): Continue Xyzal 5 mg, Flonase and azelastine 0.1% 1 spray to each nostril twice daily.\par Asthma mild persistent: Continue Arnuity 100 as per pulmonary physician.\par Metastatic lung cancer: Care as per oncologist.\par Follow-up in 6 months if well.

## 2023-05-09 ENCOUNTER — RX ONLY (RX ONLY)
Age: 73
End: 2023-05-09

## 2023-05-09 ENCOUNTER — OFFICE (OUTPATIENT)
Dept: URBAN - METROPOLITAN AREA CLINIC 105 | Facility: CLINIC | Age: 73
Setting detail: OPHTHALMOLOGY
End: 2023-05-09
Payer: MEDICARE

## 2023-05-09 DIAGNOSIS — H40.1212: ICD-10-CM

## 2023-05-09 DIAGNOSIS — H11.153: ICD-10-CM

## 2023-05-09 DIAGNOSIS — Z96.1: ICD-10-CM

## 2023-05-09 DIAGNOSIS — Z79.899: ICD-10-CM

## 2023-05-09 DIAGNOSIS — H40.1221: ICD-10-CM

## 2023-05-09 PROCEDURE — 92083 EXTENDED VISUAL FIELD XM: CPT | Performed by: OPTOMETRIST

## 2023-05-09 PROCEDURE — 92134 CPTRZ OPH DX IMG PST SGM RTA: CPT | Performed by: OPTOMETRIST

## 2023-05-09 PROCEDURE — 92014 COMPRE OPH EXAM EST PT 1/>: CPT | Performed by: OPTOMETRIST

## 2023-05-09 ASSESSMENT — REFRACTION_CURRENTRX
OD_VPRISM_DIRECTION: PROGS
OS_VPRISM_DIRECTION: PROGS
OS_CYLINDER: -1.00
OD_ADD: +3.00
OD_OVR_VA: 20/
OS_SPHERE: PLANO
OD_AXIS: 120
OS_OVR_VA: 20/
OS_ADD: +3.00
OD_SPHERE: +1.50
OS_AXIS: 092
OD_CYLINDER: -0.25

## 2023-05-09 ASSESSMENT — REFRACTION_AUTOREFRACTION
OD_AXIS: 086
OS_SPHERE: +0.25
OS_CYLINDER: -1.75
OD_SPHERE: +1.75
OD_CYLINDER: -1.00
OS_AXIS: 094

## 2023-05-09 ASSESSMENT — REFRACTION_MANIFEST
OD_SPHERE: +1.00
OS_AXIS: 085
OS_VA1: 20/25
OD_VA1: 20/25
OS_CYLINDER: -1.25
OS_SPHERE: PLANO

## 2023-05-09 ASSESSMENT — CONFRONTATIONAL VISUAL FIELD TEST (CVF)
OS_FINDINGS: FULL
OD_FINDINGS: FULL

## 2023-05-09 ASSESSMENT — AXIALLENGTH_DERIVED
OS_AL: 23.56
OD_AL: 22.472

## 2023-05-09 ASSESSMENT — KERATOMETRY
OS_K2POWER_DIOPTERS: 44.75
OS_AXISANGLE_DEGREES: 004
OS_K1POWER_DIOPTERS: 43.75
OD_K2POWER_DIOPTERS: 46.00
OD_AXISANGLE_DEGREES: 106
OD_K1POWER_DIOPTERS: 44.75

## 2023-05-09 ASSESSMENT — TONOMETRY
OD_IOP_MMHG: 10
OS_IOP_MMHG: 10

## 2023-05-09 ASSESSMENT — VISUAL ACUITY
OD_BCVA: 20/20
OS_BCVA: 20/20-1

## 2023-05-09 ASSESSMENT — PACHYMETRY
OS_CT_CORRECTION: 3
OS_CT_UM: 500
OD_CT_UM: 490
OD_CT_CORRECTION: 4

## 2023-05-09 ASSESSMENT — SPHEQUIV_DERIVED
OD_SPHEQUIV: 1.25
OS_SPHEQUIV: -0.625

## 2023-10-17 ENCOUNTER — APPOINTMENT (OUTPATIENT)
Dept: PEDIATRIC ALLERGY IMMUNOLOGY | Facility: CLINIC | Age: 73
End: 2023-10-17
Payer: MEDICARE

## 2023-10-17 VITALS
DIASTOLIC BLOOD PRESSURE: 72 MMHG | TEMPERATURE: 98.3 F | SYSTOLIC BLOOD PRESSURE: 114 MMHG | OXYGEN SATURATION: 97 % | HEART RATE: 72 BPM

## 2023-10-17 DIAGNOSIS — J45.30 MILD PERSISTENT ASTHMA, UNCOMPLICATED: ICD-10-CM

## 2023-10-17 DIAGNOSIS — C34.90 MALIGNANT NEOPLASM OF UNSPECIFIED PART OF UNSPECIFIED BRONCHUS OR LUNG: ICD-10-CM

## 2023-10-17 PROCEDURE — 99214 OFFICE O/P EST MOD 30 MIN: CPT

## 2023-10-17 RX ORDER — LEVOCETIRIZINE DIHYDROCHLORIDE 5 MG/1
5 TABLET ORAL DAILY
Qty: 90 | Refills: 3 | Status: ACTIVE | COMMUNITY
Start: 2023-04-18 | End: 1900-01-01

## 2023-10-17 RX ORDER — AZELASTINE HYDROCHLORIDE 137 UG/1
0.1 SPRAY, METERED NASAL TWICE DAILY
Qty: 3 | Refills: 3 | Status: ACTIVE | COMMUNITY
Start: 2023-04-18 | End: 1900-01-01

## 2023-10-17 RX ORDER — FLUTICASONE PROPIONATE 50 UG/1
50 SPRAY, METERED NASAL TWICE DAILY
Qty: 3 | Refills: 3 | Status: ACTIVE | COMMUNITY
Start: 2023-04-18 | End: 1900-01-01

## 2023-11-06 NOTE — H&P PST ADULT - PRO ARRIVE FROM
home
Salvatore Bobby  Child Neurology  [address], [facility] [address]  [place] [address]  Phone: [phone]  Fax: [phone]  Follow Up Time:

## 2023-11-14 ENCOUNTER — OFFICE (OUTPATIENT)
Dept: URBAN - METROPOLITAN AREA CLINIC 105 | Facility: CLINIC | Age: 73
Setting detail: OPHTHALMOLOGY
End: 2023-11-14
Payer: MEDICARE

## 2023-11-14 DIAGNOSIS — Z96.1: ICD-10-CM

## 2023-11-14 DIAGNOSIS — Z79.899: ICD-10-CM

## 2023-11-14 DIAGNOSIS — H40.1112: ICD-10-CM

## 2023-11-14 DIAGNOSIS — H40.1121: ICD-10-CM

## 2023-11-14 PROCEDURE — 99213 OFFICE O/P EST LOW 20 MIN: CPT | Performed by: OPTOMETRIST

## 2023-11-14 PROCEDURE — 92133 CPTRZD OPH DX IMG PST SGM ON: CPT | Performed by: OPTOMETRIST

## 2023-11-14 PROCEDURE — 92083 EXTENDED VISUAL FIELD XM: CPT | Performed by: OPTOMETRIST

## 2023-11-14 ASSESSMENT — REFRACTION_AUTOREFRACTION
OS_AXIS: 096
OD_AXIS: 056
OD_CYLINDER: -1.00
OS_CYLINDER: -1.75
OD_SPHERE: +1.00
OS_SPHERE: +0.75

## 2023-11-14 ASSESSMENT — SPHEQUIV_DERIVED
OD_SPHEQUIV: 0.5
OS_SPHEQUIV: -0.125

## 2023-11-14 ASSESSMENT — REFRACTION_CURRENTRX
OD_OVR_VA: 20/
OD_VPRISM_DIRECTION: PROGS
OS_CYLINDER: -1.00
OS_SPHERE: PLANO
OS_OVR_VA: 20/
OD_ADD: +3.00
OD_SPHERE: +1.50
OD_CYLINDER: -0.25
OS_VPRISM_DIRECTION: PROGS
OS_AXIS: 092
OS_ADD: +3.00
OD_AXIS: 120

## 2023-11-14 ASSESSMENT — REFRACTION_MANIFEST
OS_SPHERE: PLANO
OS_AXIS: 085
OS_CYLINDER: -1.25
OD_VA1: 20/25
OD_SPHERE: +1.00
OS_VA1: 20/25

## 2023-11-14 ASSESSMENT — CONFRONTATIONAL VISUAL FIELD TEST (CVF)
OD_FINDINGS: FULL
OS_FINDINGS: FULL

## 2024-05-08 ENCOUNTER — APPOINTMENT (OUTPATIENT)
Dept: PEDIATRIC ALLERGY IMMUNOLOGY | Facility: CLINIC | Age: 74
End: 2024-05-08
Payer: MEDICARE

## 2024-05-08 VITALS
SYSTOLIC BLOOD PRESSURE: 110 MMHG | HEART RATE: 84 BPM | OXYGEN SATURATION: 96 % | TEMPERATURE: 98.2 F | DIASTOLIC BLOOD PRESSURE: 68 MMHG

## 2024-05-08 DIAGNOSIS — J30.89 OTHER ALLERGIC RHINITIS: ICD-10-CM

## 2024-05-08 DIAGNOSIS — J45.40 MODERATE PERSISTENT ASTHMA, UNCOMPLICATED: ICD-10-CM

## 2024-05-08 DIAGNOSIS — J30.1 ALLERGIC RHINITIS DUE TO POLLEN: ICD-10-CM

## 2024-05-08 DIAGNOSIS — R05.9 COUGH, UNSPECIFIED: ICD-10-CM

## 2024-05-08 DIAGNOSIS — J06.9 ACUTE UPPER RESPIRATORY INFECTION, UNSPECIFIED: ICD-10-CM

## 2024-05-08 PROCEDURE — 99214 OFFICE O/P EST MOD 30 MIN: CPT

## 2024-05-08 RX ORDER — AMOXICILLIN AND CLAVULANATE POTASSIUM 875; 125 MG/1; MG/1
875-125 TABLET, COATED ORAL
Qty: 28 | Refills: 0 | Status: ACTIVE | COMMUNITY
Start: 2024-05-08 | End: 1900-01-01

## 2024-05-08 NOTE — ASSESSMENT
[FreeTextEntry1] : Severe cough, cold sweats in the last several days: Most likely due to an underlying infection, either sinusitis or possible pneumonia.  Referred for chest x-ray PA and lateral.  Continue Augmentin 875 mg twice daily for 10 to 14 days.  Has follow-up with pulmonary physician next week.  Use levalbuterol 1.25 mg nebulizer 2-3 times daily, up to 4 times daily, and use Arnuity 100, 1 puff after each nebulizer treatment until symptoms improve.  Continue higher dose of hydrocortisone, due to infection/acute illness.. Allergic rhinitis: Continue Flonase and azelastine, 1 spray per nostril twice daily and Xyzal 5 mg. History of lung cancer: Follow-up with oncologist.

## 2024-05-08 NOTE — PHYSICAL EXAM
[Alert] : alert [No Acute Distress] : no acute distress [Supple] : the neck was supple [Soft] : abdomen soft [Normal Cervical Lymph Nodes] : cervical [Skin Intact] : skin intact  [No clubbing] : no clubbing [No Cyanosis] : no cyanosis [Alert, Awake, Oriented as Age-Appropriate] : alert, awake, oriented as age appropriate [de-identified] : Slightly raspy voice.  Occasional deep cough. [de-identified] : Eyes with mild conjunctival erythema bilateral. [de-identified] : Throat mild patchy redness.  Nasal mucosa red, perforated nasal septum with clean margins, mild stuffy nose, scant clear discharge.  Tympanic membranes dull, no redness on the right. [de-identified] : Chest good air entry, coarse breath sounds, no wheezing, few crackles in the right base. [de-identified] : S1-S2 regular.

## 2024-05-08 NOTE — HISTORY OF PRESENT ILLNESS
[de-identified] : In office for increased symptoms in the last several days.  About 5 days ago felt chilly and had cold sweats.  By next day he continued to have cold sweats and had unsteady walking, was lethargic, and developed increased cough and congestion.  He needed to change 6-7 times due to cold sweats.  Temperature went up from 96.6-99.2.  He also had a runny nose.  He increased the hydrocortisone dose from 10 mg in the morning and 5 mg in the evening to 20 mg in the morning and 10 mg in the evening for adrenal insufficiency.  Today he is feeling a little bit better.  Cough has mostly white mucus now.  He also has pain in the right ear. He had severe cough with mucus that was brown and bloody in January and April.  Both times he was treated with Augmentin.  He started Augmentin 2 days ago with some improvement.  He also takes Xyzal 5 mg, Flonase and Astelin 1 spray per nostril twice daily and Xopenex 1.25 mg nebulizer twice daily since the symptoms started.  Otherwise he uses Arnuity 100 once daily.  He also uses Spiriva for 20 days, at the advice of pulmonary physician.  He uses CPAP at night. He has a long history of asthma with cough and mucus.  In 2023, pulmonary function test was normal including DLCO, and care of pulmonary.  Asthma symptoms improved when he started using CPAP several years ago.  He had surgery for lung cancer on the right side followed by treatment with Keytruda for 2 years, stopped in December 2022.  He had bilateral adrenal gland removal for metastatic lung cancer.  CT of the chest in February stable, with some nodules but no recurrence or metastases.  He also suffers from diabetes. He had 2 sinus surgeries 30 years apart. He received immunotherapy in our office for many years. Has appointment with pulmonary physician next week, seen by primary care physician this week, also sees cardiology next week.  Has appointment with oncologist in early July.  Also in care of endocrinologist.  Latest hemoglobin A1c was 6.4.  He had a colonoscopy that was unremarkable. Had Prevnar 20 and influenza vaccine on 11/6/2023. Blood work from primary care physician yesterday, printed from the portal showed hemoglobin A1c of 6.4, lipid profile normal, glucose 136, BUN/creatinine 36/1.42, slightly increased WBC (11.35) with increased neutrophils (82.6%), absolute eosinophil count 200.  CBC with differential in favor of bacterial infection, unless it is a consequence of higher dose of hydrocortisone.

## 2024-05-09 ENCOUNTER — NON-APPOINTMENT (OUTPATIENT)
Age: 74
End: 2024-05-09

## 2024-05-14 ENCOUNTER — OFFICE (OUTPATIENT)
Dept: URBAN - METROPOLITAN AREA CLINIC 105 | Facility: CLINIC | Age: 74
Setting detail: OPHTHALMOLOGY
End: 2024-05-14
Payer: MEDICARE

## 2024-05-14 DIAGNOSIS — H11.153: ICD-10-CM

## 2024-05-14 DIAGNOSIS — H40.1112: ICD-10-CM

## 2024-05-14 DIAGNOSIS — H35.371: ICD-10-CM

## 2024-05-14 DIAGNOSIS — H40.1121: ICD-10-CM

## 2024-05-14 PROCEDURE — 92014 COMPRE OPH EXAM EST PT 1/>: CPT | Performed by: OPTOMETRIST

## 2024-05-14 PROCEDURE — 92250 FUNDUS PHOTOGRAPHY W/I&R: CPT | Performed by: OPTOMETRIST

## 2024-05-14 ASSESSMENT — CONFRONTATIONAL VISUAL FIELD TEST (CVF)
OS_FINDINGS: FULL
OD_FINDINGS: FULL

## 2024-08-26 ENCOUNTER — RX RENEWAL (OUTPATIENT)
Age: 74
End: 2024-08-26

## 2024-11-26 ENCOUNTER — OFFICE (OUTPATIENT)
Dept: URBAN - METROPOLITAN AREA CLINIC 105 | Facility: CLINIC | Age: 74
Setting detail: OPHTHALMOLOGY
End: 2024-11-26
Payer: MEDICARE

## 2024-11-26 DIAGNOSIS — H40.1121: ICD-10-CM

## 2024-11-26 DIAGNOSIS — H52.4: ICD-10-CM

## 2024-11-26 DIAGNOSIS — H11.153: ICD-10-CM

## 2024-11-26 DIAGNOSIS — H40.1112: ICD-10-CM

## 2024-11-26 PROCEDURE — 92083 EXTENDED VISUAL FIELD XM: CPT | Performed by: OPTOMETRIST

## 2024-11-26 PROCEDURE — 92015 DETERMINE REFRACTIVE STATE: CPT | Performed by: OPTOMETRIST

## 2024-11-26 PROCEDURE — 92012 INTRM OPH EXAM EST PATIENT: CPT | Performed by: OPTOMETRIST

## 2024-11-26 PROCEDURE — 92133 CPTRZD OPH DX IMG PST SGM ON: CPT | Performed by: OPTOMETRIST

## 2024-11-26 ASSESSMENT — REFRACTION_AUTOREFRACTION
OD_CYLINDER: -0.50
OD_AXIS: 076
OS_AXIS: 098
OS_SPHERE: +0.25
OD_SPHERE: +1.00
OS_CYLINDER: -1.75

## 2024-11-26 ASSESSMENT — REFRACTION_MANIFEST
OD_SPHERE: +1.25
OS_SPHERE: +0.50
OD_AXIS: 075
OS_AXIS: 100
OS_CYLINDER: -1.50
OS_ADD: +2.50
OD_CYLINDER: -0.50
OS_VA1: 20/20
OD_ADD: +2.50
OD_VA1: 20/20

## 2024-11-26 ASSESSMENT — KERATOMETRY
OS_K1POWER_DIOPTERS: 43.50
OD_K2POWER_DIOPTERS: 45.25
OD_K1POWER_DIOPTERS: 44.75
OS_K2POWER_DIOPTERS: 45.00
OD_AXISANGLE_DEGREES: 115
OS_AXISANGLE_DEGREES: 003

## 2024-11-26 ASSESSMENT — REFRACTION_CURRENTRX
OD_OVR_VA: 20/
OD_CYLINDER: -0.25
OD_AXIS: 133
OD_ADD: +2.75
OS_OVR_VA: 20/
OS_ADD: +2.75
OS_CYLINDER: -1.00
OS_AXIS: 092
OD_SPHERE: +1.50
OD_VPRISM_DIRECTION: PROGS
OS_SPHERE: PLANO
OS_VPRISM_DIRECTION: PROGS

## 2024-11-26 ASSESSMENT — CONFRONTATIONAL VISUAL FIELD TEST (CVF)
OS_FINDINGS: FULL
OD_FINDINGS: FULL

## 2024-11-26 ASSESSMENT — TONOMETRY
OD_IOP_MMHG: 13
OS_IOP_MMHG: 11
OD_IOP_MMHG: 13
OS_IOP_MMHG: 13

## 2024-11-26 ASSESSMENT — VISUAL ACUITY
OS_BCVA: 20/20-2
OD_BCVA: 20/20

## 2024-11-26 ASSESSMENT — PACHYMETRY
OS_CT_UM: 500
OD_CT_UM: 490
OD_CT_CORRECTION: 4
OS_CT_CORRECTION: 3

## 2025-01-17 RX ORDER — ANTISEPTIC SURGICAL SCRUB 0.04 MG/ML
1 SOLUTION TOPICAL ONCE
Refills: 0 | Status: DISCONTINUED | OUTPATIENT
Start: 2025-01-21 | End: 2025-02-04

## 2025-01-17 NOTE — H&P PST ADULT - ASSESSMENT
75 yo M with PMHx asthma, COPD, (on CPAP), carotid stenosis disorder, Meloma, obesity, PAD, adrenal cancer, DM, malignant spindle cell neoplasm, pancreatitis, HTN, HLD and CAD (s/p Jose x 2 LAD and JOSE x 1 First  diag in 2017) PAD stent 2005 and again in 2008 after occlusion.  Repeat cardiac cath in 2009 revealed patent stent with 80-90% diagonal branch occlusion treated medically who presents today x [facility] due to SOB and abnormal NST on 12/11/2024 showing apical ischemia.     Risk Stratification:  ASA: 3  Mallampati:   Bleeding Risk:   Creatinine:   GFR:   Pt assessed, appropriate for sedation, pt educated regarding the plan for Versed/Fentanyl as needed.    Plan/Recommendations:   -plan for [facility]   -preferred access: RRA vs LHXC  -patient seen and examined  -confirmed appropriate NPO duration  -ECG and Labs reviewed  -Aspirin 81mg po pre-cath ***  -NS 250mL IV bolus pre-cath ***  -procedure discussed with patient; risks and benefits explained, questions answered  -consent obtained by attending IC    Risks, benefits, and alternatives reviewed.  Risks including but not limited to MI, death, stroke, bleeding, infection, vessel injury, hematoma, renal failure, allergic reaction, urgent open heart surgery, restenosis and stent thrombosis were reviewed.  All questions answered.  Patient is agreeable to proceed.   73 yo M with PMHx asthma, COPD, (on CPAP), carotid stenosis disorder, Meloma, obesity, PAD, adrenal cancer, DM, malignant spindle cell neoplasm, pancreatitis, HTN, HLD and CAD (s/p Jose x 2 LAD and JOSE x 1 First  diag in 2017) PAD stent 2005 and again in 2008 after occlusion.  Repeat cardiac cath in 2009 revealed patent stent with 80-90% diagonal branch occlusion treated medically who presents today x [facility] due to SOB and abnormal NST on 12/11/2024 showing apical ischemia.     Risk Stratification:  ASA: 3  Mallampati: 3  Bleeding Risk: 0.8%  Creatinine: 1.17  GFR: 65  Pt assessed, appropriate for sedation, pt educated regarding the plan for Versed/Fentanyl as needed.    Plan/Recommendations:   -plan for [facility]   -preferred access: RRA vs C  -patient seen and examined  -confirmed appropriate NPO duration  -ECG and Labs reviewed  -Aspirin 81mg po pre-cath   -NS 250mL IV bolus pre-cath   -procedure discussed with patient; risks and benefits explained, questions answered  -consent obtained by attending DR ASHLEY    Risks, benefits, and alternatives reviewed.  Risks including but not limited to MI, death, stroke, bleeding, infection, vessel injury, hematoma, renal failure, allergic reaction, urgent open heart surgery, restenosis and stent thrombosis were reviewed.  All questions answered.  Patient is agreeable to proceed.

## 2025-01-17 NOTE — H&P PST ADULT - NSICDXPASTMEDICALHX_GEN_ALL_CORE_FT
PAST MEDICAL HISTORY:  Abnormal stress test     Asthma     CAD (coronary artery disease)     Detached retina, bilateral repaired    Diverticulosis     GERD (gastroesophageal reflux disease) pharyngeal reflux, gastro-esophageal reflux  without esophagitis    Glaucoma open angle    Hiatal hernia     HLD (hyperlipidemia)     Skin cancer     Sleep apnea on  cpap  setting  15

## 2025-01-17 NOTE — H&P PST ADULT - NSICDXPASTSURGICALHX_GEN_ALL_CORE_FT
PAST SURGICAL HISTORY:  Deviated septum repaired surgically    H/O left inguinal hernia repair     H/O right knee surgery     H/O umbilical hernia repair     History of cataract surgery     History of tonsillectomy     S/P angioplasty with stent LAD

## 2025-01-17 NOTE — H&P PST ADULT - HISTORY OF PRESENT ILLNESS
Narrative: 73 yo M with PMHx asthma, COPD, (on CPAP), carotid stenosis disorder, Meloma, obesity, PAD, adrenal cancer, DM, malignant spindle cell neoplasm, pancreatitis, HTN, HLD and CAD (s/p Jose x 2 LAD and JOSE x 1 First  diag in 2017) PAD stent 2005 and again in 2008 after occlusion.  Repeat cardiac cath in 2009 revealed patent stent with 80-90% diagonal branch occlusion treated medically who presents today x [facility] due to SOB and abnormal NST on 12/11/2024 showing apical ischemia.     Symptoms:        Angina (Class): 2       Ischemic Symptoms: SOB     Heart Failure: NA       Systolic/Diastolic/Combined:        NYHA Class (within 2 weeks):     Assessment of LVEF       EF: 72%       Assessed by: NST       Date: 12/22/2024    Prior Cardiac Interventions (LHC, stents, CABG):       CABG (Date, Grafts): NA    Cardiac Cath Lab - Adult (11.21.17 @ 11:00) >  VENTRICLES: There were no left ventricular global or regional wall motion  abnormalities. Global left ventricular function was normal. EF estimated  was 65 %.  VALVES: MITRAL VALVE: The mitral valve exhibited trivial regurgitation  (less than 1+).  CORONARY VESSELS: The coronary circulation is right dominant.  LM:   --  LM: Normal.  LAD:   --  Proximal LAD: There was a tubular 70 % stenosis.  --  Mid LAD: There was a tubular 100 % stenosis at the site of a prior  stent.  --  D1: There was a 90 % stenosis.  CX:   --  Ostial circumflex: There was a discrete 50 % stenosis.  RI:   --  Proximal ramus intermedius: There was a tubular 25 % stenosis.  RCA:   --  Mid RCA: There was a tubular 20 % stenosis.  SUMMARY:  1ST LESION INTERVENTIONS: A successful drug-eluting stent with laser  atherectomy was performed on the 100 % lesion in the mid LAD. Following  intervention there was an excellent angiographic appearance with a 0 %  residual stenosis.  2ND LESION INTERVENTIONS: A successful balloon angioplasty was performed on  the 90 % lesion in the 1st diagonal. Following intervention there was an  excellent angiographic appearance with a 40 % residual stenosis.  3RD LESION INTERVENTIONS: A successful drug-eluting stent was performed on  the 70 % lesion in the proximal LAD. Following intervention there was an  excellent angiographic appearance with a 0 % residual stenosis.  < end of copied text >    Nuclear Stress Test: 12/11/2024  Small sized, mild to moderate severity, anterior, complete reversible defect consistent with ischemia     Echo 06/10/2024  Normal global  ventricular contractility  EF 55-60%  The diastolic filling pattern indicates impaired relaxation  Right ventricle mildly enlarged  trace MR  Mild TR    Antianginal Therapies:        Beta Blockers:         Calcium Channel Blockers: Norvasc 5 mg QD        Long Acting Nitrates:        Ranexa:     Associated Risk Factors:        Frailty Score: (N/A, mild, moderate, severe)*************       Cerebrovascular Disease: N/A       Chronic Lung Disease: COPD. Asthma        Peripheral Arterial Disease: yes        Chronic Kidney Disease (if yes, what is GFR): N/A       Uncontrolled Diabetes (if yes, what is HgbA1C or FBS): N/A       Poorly Controlled Hypertension (if yes, what is SBP): N/A       Morbid Obesity (if yes, what is BMI): yes BMI*****************       History of Recent Ventricular Arrhythmia: N/A       Inability to Ambulate Safely: N/A       Need for Therapeutic Anticoagulation: N/A       Antiplatelet or Contrast Allergy: N/A    VITAL SIGNS:    LABS:   Narrative: 73 yo M with PMHx asthma, COPD, (on CPAP), carotid stenosis disorder, Meloma, obesity, PAD, adrenal cancer, DM, malignant spindle cell neoplasm, pancreatitis, HTN, HLD and CAD , s/p stents in LAD and Diag, in 2005 and 2008   PAD stent 2005 and again in 2008 after occlusion.    Repeat cath in 2017 revealed  LM:   --  LM: Normal., LAD:   --  Proximal LAD: There was a tubular 70 % stenosis.,   Mid LAD: There was a tubular 100 % stenosis at the site of a prior  stent.,  D1: There was a 90 % stenosis.CX:   --  Ostial circumflex: There was a discrete 50 % stenosis. RI:   --  Proximal ramus intermedius: There was a tubular 25 % stenosis.RCA:   --  Mid RCA: There was a tubular 20 % stenosis.  S/P DESX1 to proximal proximal LAD,  DESX1 to mid LAD, DESX1 TO DIAG  Pt states that he is having night sweats , started in summer and alfo feeling very fatigued recently.  He  presents today x [facility] due to SOB and abnormal NST on 12/11/2024 showing apical ischemia. and pt symptomatic with night swaets and fatigue.    Symptoms:        Angina (Class): 2       Ischemic Symptoms: SOB     Heart Failure: NA       Systolic/Diastolic/Combined:        NYHA Class (within 2 weeks):     Assessment of LVEF       EF: 72%       Assessed by: NST       Date: 12/22/2024    Prior Cardiac Interventions (LHC, stents, CABG):       CABG (Date, Grafts): NA    Cardiac Cath Lab - Adult (11.21.17 @ 11:00) >  VENTRICLES: There were no left ventricular global or regional wall motion  abnormalities. Global left ventricular function was normal. EF estimated  was 65 %.  VALVES: MITRAL VALVE: The mitral valve exhibited trivial regurgitation  (less than 1+).  CORONARY VESSELS: The coronary circulation is right dominant.  LM:   --  LM: Normal.  LAD:   --  Proximal LAD: There was a tubular 70 % stenosis.  --  Mid LAD: There was a tubular 100 % stenosis at the site of a prior  stent.  --  D1: There was a 90 % stenosis.  CX:   --  Ostial circumflex: There was a discrete 50 % stenosis.  RI:   --  Proximal ramus intermedius: There was a tubular 25 % stenosis.  RCA:   --  Mid RCA: There was a tubular 20 % stenosis.  SUMMARY:  1ST LESION INTERVENTIONS: A successful drug-eluting stent with laser  atherectomy was performed on the 100 % lesion in the mid LAD. Following  intervention there was an excellent angiographic appearance with a 0 %  residual stenosis.  2ND LESION INTERVENTIONS: A successful balloon angioplasty was performed on  the 90 % lesion in the 1st diagonal. Following intervention there was an  excellent angiographic appearance with a 40 % residual stenosis.  3RD LESION INTERVENTIONS: A successful drug-eluting stent was performed on  the 70 % lesion in the proximal LAD. Following intervention there was an  excellent angiographic appearance with a 0 % residual stenosis.  < end of copied text >    Nuclear Stress Test: 12/11/2024  Small sized, mild to moderate severity, anterior, complete reversible defect consistent with ischemia     Echo 06/10/2024  Normal global  ventricular contractility  EF 55-60%  The diastolic filling pattern indicates impaired relaxation  Right ventricle mildly enlarged  trace MR  Mild TR    Antianginal Therapies:        Beta Blockers:         Calcium Channel Blockers: Norvasc 5 mg QD        Long Acting Nitrates:        Ranexa:     Associated Risk Factors:        Frailty Score: (N/A, mild, moderate, severe)*************       Cerebrovascular Disease: N/A       Chronic Lung Disease: COPD. Asthma        Peripheral Arterial Disease: yes        Chronic Kidney Disease (if yes, what is GFR): N/A       Uncontrolled Diabetes (if yes, what is HgbA1C or FBS): N/A       Poorly Controlled Hypertension (if yes, what is SBP): N/A       Morbid Obesity (if yes, what is BMI): yes BMI*****************       History of Recent Ventricular Arrhythmia: N/A       Inability to Ambulate Safely: N/A       Need for Therapeutic Anticoagulation: N/A       Antiplatelet or Contrast Allergy: N/A

## 2025-01-21 ENCOUNTER — TRANSCRIPTION ENCOUNTER (OUTPATIENT)
Age: 75
End: 2025-01-21

## 2025-01-21 ENCOUNTER — OUTPATIENT (OUTPATIENT)
Dept: OUTPATIENT SERVICES | Facility: HOSPITAL | Age: 75
LOS: 1 days | End: 2025-01-21
Payer: MEDICARE

## 2025-01-21 VITALS
HEART RATE: 81 BPM | SYSTOLIC BLOOD PRESSURE: 131 MMHG | RESPIRATION RATE: 15 BRPM | OXYGEN SATURATION: 96 % | DIASTOLIC BLOOD PRESSURE: 82 MMHG

## 2025-01-21 VITALS
TEMPERATURE: 98 F | HEART RATE: 80 BPM | RESPIRATION RATE: 12 BRPM | DIASTOLIC BLOOD PRESSURE: 68 MMHG | SYSTOLIC BLOOD PRESSURE: 136 MMHG | OXYGEN SATURATION: 97 %

## 2025-01-21 DIAGNOSIS — Z90.89 ACQUIRED ABSENCE OF OTHER ORGANS: Chronic | ICD-10-CM

## 2025-01-21 DIAGNOSIS — R94.39 ABNORMAL RESULT OF OTHER CARDIOVASCULAR FUNCTION STUDY: ICD-10-CM

## 2025-01-21 LAB
ALBUMIN SERPL ELPH-MCNC: 3.9 G/DL — SIGNIFICANT CHANGE UP (ref 3.3–5.2)
ALP SERPL-CCNC: 73 U/L — SIGNIFICANT CHANGE UP (ref 40–120)
ALT FLD-CCNC: 21 U/L — SIGNIFICANT CHANGE UP
ANION GAP SERPL CALC-SCNC: 16 MMOL/L — SIGNIFICANT CHANGE UP (ref 5–17)
AST SERPL-CCNC: 30 U/L — SIGNIFICANT CHANGE UP
BILIRUB SERPL-MCNC: 0.4 MG/DL — SIGNIFICANT CHANGE UP (ref 0.4–2)
BLD GP AB SCN SERPL QL: SIGNIFICANT CHANGE UP
BUN SERPL-MCNC: 21.9 MG/DL — HIGH (ref 8–20)
CALCIUM SERPL-MCNC: 9.2 MG/DL — SIGNIFICANT CHANGE UP (ref 8.4–10.5)
CHLORIDE SERPL-SCNC: 102 MMOL/L — SIGNIFICANT CHANGE UP (ref 96–108)
CO2 SERPL-SCNC: 25 MMOL/L — SIGNIFICANT CHANGE UP (ref 22–29)
CREAT SERPL-MCNC: 1.17 MG/DL — SIGNIFICANT CHANGE UP (ref 0.5–1.3)
EGFR: 65 ML/MIN/1.73M2 — SIGNIFICANT CHANGE UP
GLUCOSE SERPL-MCNC: 100 MG/DL — HIGH (ref 70–99)
HCT VFR BLD CALC: 43.3 % — SIGNIFICANT CHANGE UP (ref 39–50)
HGB BLD-MCNC: 14.9 G/DL — SIGNIFICANT CHANGE UP (ref 13–17)
MAGNESIUM SERPL-MCNC: 1.8 MG/DL — SIGNIFICANT CHANGE UP (ref 1.6–2.6)
MCHC RBC-ENTMCNC: 31 PG — SIGNIFICANT CHANGE UP (ref 27–34)
MCHC RBC-ENTMCNC: 34.4 G/DL — SIGNIFICANT CHANGE UP (ref 32–36)
MCV RBC AUTO: 90.2 FL — SIGNIFICANT CHANGE UP (ref 80–100)
PLATELET # BLD AUTO: 171 K/UL — SIGNIFICANT CHANGE UP (ref 150–400)
POTASSIUM SERPL-MCNC: 4 MMOL/L — SIGNIFICANT CHANGE UP (ref 3.5–5.3)
POTASSIUM SERPL-SCNC: 4 MMOL/L — SIGNIFICANT CHANGE UP (ref 3.5–5.3)
PROT SERPL-MCNC: 6.8 G/DL — SIGNIFICANT CHANGE UP (ref 6.6–8.7)
RBC # BLD: 4.8 M/UL — SIGNIFICANT CHANGE UP (ref 4.2–5.8)
RBC # FLD: 13.8 % — SIGNIFICANT CHANGE UP (ref 10.3–14.5)
SODIUM SERPL-SCNC: 143 MMOL/L — SIGNIFICANT CHANGE UP (ref 135–145)
WBC # BLD: 8.16 K/UL — SIGNIFICANT CHANGE UP (ref 3.8–10.5)
WBC # FLD AUTO: 8.16 K/UL — SIGNIFICANT CHANGE UP (ref 3.8–10.5)

## 2025-01-21 PROCEDURE — 86901 BLOOD TYPING SEROLOGIC RH(D): CPT

## 2025-01-21 PROCEDURE — C1894: CPT

## 2025-01-21 PROCEDURE — 93005 ELECTROCARDIOGRAM TRACING: CPT

## 2025-01-21 PROCEDURE — C1769: CPT

## 2025-01-21 PROCEDURE — 93458 L HRT ARTERY/VENTRICLE ANGIO: CPT

## 2025-01-21 PROCEDURE — 80053 COMPREHEN METABOLIC PANEL: CPT

## 2025-01-21 PROCEDURE — 36415 COLL VENOUS BLD VENIPUNCTURE: CPT

## 2025-01-21 PROCEDURE — 86850 RBC ANTIBODY SCREEN: CPT

## 2025-01-21 PROCEDURE — 83735 ASSAY OF MAGNESIUM: CPT

## 2025-01-21 PROCEDURE — 85027 COMPLETE CBC AUTOMATED: CPT

## 2025-01-21 PROCEDURE — C1887: CPT

## 2025-01-21 PROCEDURE — 93010 ELECTROCARDIOGRAM REPORT: CPT

## 2025-01-21 PROCEDURE — 86900 BLOOD TYPING SEROLOGIC ABO: CPT

## 2025-01-21 RX ORDER — BACTERIOSTATIC SODIUM CHLORIDE 0.9 %
250 VIAL (ML) INJECTION ONCE
Refills: 0 | Status: DISCONTINUED | OUTPATIENT
Start: 2025-01-21 | End: 2025-02-04

## 2025-01-21 RX ORDER — TIOTROPIUM BROMIDE MONOHYDRATE 18 UG/1
2 CAPSULE ORAL; RESPIRATORY (INHALATION)
Refills: 0 | DISCHARGE

## 2025-01-21 RX ORDER — ALBUTEROL 90 MCG
2 AEROSOL REFILL (GRAM) INHALATION
Refills: 0 | DISCHARGE

## 2025-01-21 RX ORDER — FLUTICASONE PROPIONATE 44 UG/1
1 AEROSOL, METERED RESPIRATORY (INHALATION)
Refills: 0 | DISCHARGE

## 2025-01-21 NOTE — DISCHARGE NOTE PROVIDER - NSDCMRMEDTOKEN_GEN_ALL_CORE_FT
Albuterol (Eqv-ProAir HFA) 90 mcg/inh inhalation aerosol: 2 inhaled every 4 hours as needed for prn  Arnuity Ellipta 100 mcg inhalation powder: 1 inhaled once a day  aspirin 81 mg oral tablet: 1 tab(s) orally once a day  Avodart 0.5 mg oral capsule: 1 cap(s) orally once a day  Crestor 40 mg oral tablet: 1 tab(s) orally once a day (at bedtime)  Flomax 0.4 mg oral capsule: 2 cap(s) orally once a day  levalbuterol 1.25 mg/3 mL inhalation solution: 3 milliliter(s) inhaled 2 times a day  men&#x27;s one a day multivitamin: 1 tab(s) orally once a day  Probiotic Formula oral capsule: 1 cap(s) orally once a day  Spiriva Respimat 1.25 mcg/inh inhalation aerosol: 2 inhaled once a day  vitamin b complex: 1 tab(s) orally once a day  Vitamin C 500 mg oral tablet: 1 tab(s) orally once a day  Vitamin D3 2000 intl units oral tablet: 1 tab(s) orally once a day  Xyzal 5 mg oral tablet: 1 tab(s) orally once a day (in the evening)   Albuterol (Eqv-ProAir HFA) 90 mcg/inh inhalation aerosol: 2 inhaled every 4 hours as needed for prn  Arnuity Ellipta 100 mcg inhalation powder: 1 inhaled once a day  aspirin 81 mg oral tablet: 1 tab(s) orally once a day  Avodart 0.5 mg oral capsule: 1 cap(s) orally once a day  Crestor 40 mg oral tablet: 1 tab(s) orally once a day (at bedtime)  Flomax 0.4 mg oral capsule: 2 cap(s) orally once a day  Hydrocortisone 5 mg daily:   levalbuterol 1.25 mg/3 mL inhalation solution: 3 milliliter(s) inhaled 2 times a day  men&#x27;s one a day multivitamin: 1 tab(s) orally once a day  Probiotic Formula oral capsule: 1 cap(s) orally once a day  Spiriva Respimat 1.25 mcg/inh inhalation aerosol: 2 inhaled once a day  vitamin b complex: 1 tab(s) orally once a day  Vitamin C 500 mg oral tablet: 1 tab(s) orally once a day  Vitamin D3 2000 intl units oral tablet: 1 tab(s) orally once a day  Xyzal 5 mg oral tablet: 1 tab(s) orally once a day (in the evening)

## 2025-01-21 NOTE — DISCHARGE NOTE PROVIDER - CARE PROVIDER_API CALL
Juan Diego Navarrete  Interventional Cardiology  [address] [place] [address], [address]  [place] [address]  Phone: [phone]  Fax: [phone]  Follow Up Time: 2 weeks

## 2025-01-21 NOTE — DISCHARGE NOTE PROVIDER - NSDCCPTREATMENT_GEN_ALL_CORE_FT
PRINCIPAL PROCEDURE  Procedure: Left heart cardiac cath  Findings and Treatment: Patient underwent left heart cath on 01/21/25  Revealed patent stents  No obstructive CAD  Restricted use with no heavy lifting of affected arm for 48 hours.  No submerging the arm in water for 48 hours.  You may start showering today.  Call your doctor for any bleeding, swelling, loss of sensation in the hand or fingers, or fingers turning blue.  If heavy bleeding or large lumps form, hold pressure at the spot and come to the Emergency Room.

## 2025-01-21 NOTE — CHART NOTE - NSCHARTNOTEFT_GEN_A_CORE
Department of Cardiology                                                                  [facility]/[place] [address] [address]                                                            Telephone: [phone]. Fax:[phone]                                                    Post- Procedure Note: Left Heart Cardiac Catheterization       Narrative:   Patient now s/p left heart catheterization via RRA  approach (RRB in place), with Dr. LOCO, tolerated procedure well without complications. Arrived to recovery room NAD and hemodynamically stable, distal pulse +, neurovascular intact          PAST MEDICAL & SURGICAL HISTORY:  CAD (coronary artery disease)      Sleep apnea  on  cpap  setting  15      HLD (hyperlipidemia)      Skin cancer      Hiatal hernia      GERD (gastroesophageal reflux disease)  pharyngeal reflux, gastro-esophageal reflux  without esophagitis      Diverticulosis      Glaucoma  open angle      Detached retina, bilateral  repaired      Abnormal stress test      Asthma      Deviated septum  repaired surgically      History of cataract surgery      H/O left inguinal hernia repair      H/O umbilical hernia repair      H/O right knee surgery      S/P angioplasty with stent  LAD      History of tonsillectomy        Home Medications:  Albuterol (Eqv-ProAir HFA) 90 mcg/inh inhalation aerosol: 2 inhaled every 4 hours as needed for prn (21 Jan 2025 14:20)  Arnuity Ellipta 100 mcg inhalation powder: 1 inhaled once a day (21 Jan 2025 14:15)  aspirin 81 mg oral tablet: 1 tab(s) orally once a day (21 Jan 2025 14:24)  Avodart 0.5 mg oral capsule: 1 cap(s) orally once a day (21 Jan 2025 14:24)  Crestor 40 mg oral tablet: 1 tab(s) orally once a day (at bedtime) (21 Jan 2025 14:24)  Flomax 0.4 mg oral capsule: 2 cap(s) orally once a day (21 Jan 2025 14:24)  levalbuterol 1.25 mg/3 mL inhalation solution: 3 milliliter(s) inhaled 2 times a day (21 Jan 2025 14:24)  men&#x27;s one a day multivitamin: 1 tab(s) orally once a day (21 Jan 2025 14:24)  Probiotic Formula oral capsule: 1 cap(s) orally once a day (21 Jan 2025 14:24)  Spiriva Respimat 1.25 mcg/inh inhalation aerosol: 2 inhaled once a day (21 Jan 2025 14:22)  vitamin b complex: 1 tab(s) orally once a day (21 Jan 2025 14:24)  Vitamin C 500 mg oral tablet: 1 tab(s) orally once a day (21 Jan 2025 14:24)  Vitamin D3 2000 intl units oral tablet: 1 tab(s) orally once a day (21 Jan 2025 14:24)  Xyzal 5 mg oral tablet: 1 tab(s) orally once a day (in the evening) (21 Jan 2025 14:24)        sulfa drugs (Rash)      Objective:  Vital Signs Last 24 Hrs  T(C): 36.5 (21 Jan 2025 11:40), Max: 36.5 (21 Jan 2025 11:40)  T(F): 97.7 (21 Jan 2025 11:40), Max: 97.7 (21 Jan 2025 11:40)  HR: 78 (21 Jan 2025 14:27) (75 - 80)  BP: 125/77 (21 Jan 2025 14:27) (122/75 - 136/68)  BP(mean): --  RR: 12 (21 Jan 2025 14:27) (12 - 14)  SpO2: 95% (21 Jan 2025 14:27) (95% - 97%)    Parameters below as of 21 Jan 2025 14:27  Patient On (Oxygen Delivery Method): room air        GENERAL: Pt lying comfortably, NAD.  ENMT: PERRL, +EOMI.  NECK: soft, Supple, No JVD,   CHEST/LUNG: Clear to auscultatation bilaterally; No wheezing.  HEART: S1S2+, Regular rate and rhythm; No murmurs.  ABDOMEN: Soft, Nontender, Nondistended; Bowel sounds present.  MUSCULOSKELETAL: Normal range of motion.  SKIN: No rashes or lesions.  NEURO: AAOX3, no focal deficits, no motor r sensory loss.  PSYCH: normal mood.  Procedure site: RRA  no signs of bleeding or hematoma, neurovascular intact   VASCULAR:   Radial +2 R/+2 L  Femoral +2 R/+2 L  PT +2 R/+2 L  DP +2 R/+2 L                          14.9   8.16  )-----------( 171      ( 21 Jan 2025 11:50 )             43.3     01-21    143  |  102  |  21.9[H]  ----------------------------<  100[H]  4.0   |  25.0  |  1.17    Ca    9.2      21 Jan 2025 11:50  Mg     1.8     01-21    TPro  6.8  /  Alb  3.9  /  TBili  0.4  /  DBili  x   /  AST  30  /  ALT  21  /  AlkPhos  73  01-21    Patient now s/p left heart catheterization via RRA  approach (RRB in place), with Dr. LOCO, tolerated procedure well without complications. Arrived to recovery room NAD and hemodynamica      Intraprocedurally:  Patient   Findings:   Post Cath EKG:    -ADMIT due to: / Pt is already in-patient  -post cardiac cath orders  -radial or groin precautions  -bedrest x hours post procedure  -EKG post cath  -labs and EKG in am  -NS 0.9% 250ml/hr x 1 bolus: post procedure ARMOND ppx   -continue current medical therapy  -statin therapy  -beta blocker  -follow up outpt in 2 weeks with Cardiologist  -[facility] following during hospitalization  -Lifestyle modifications discussed to reduce cardiovascular risk factors including weight reduction, smoking cessation, medication compliance, and routine follow up with Cardiologist to track your BMI, cholesterol, and glucose levels.   - .rehab  -Management per Hospitalist / ICU  -Discharge in am if overnight tele, EKG, labs in am all remain WNL                                                            75 yo M with PMHx asthma, COPD, (on CPAP), carotid stenosis disorder, Meloma, obesity, PAD, adrenal cancer, DM, malignant spindle cell neoplasm, pancreatitis, HTN, HLD and CAD , s/p stents in LAD and Diag, in 2005 and 2008   PAD stent 2005 and again in 2008 after occlusion.    Repeat cath in 2017 revealed  LM:   --  LM: Normal., LAD:   --  Proximal LAD: There was a tubular 70 % stenosis.,   Mid LAD: There was a tubular 100 % stenosis at the site of a prior  stent.,  D1: There was a 90 % stenosis.CX:   --  Ostial circumflex: There was a discrete 50 % stenosis. RI:   --  Proximal ramus intermedius: There was a tubular 25 % stenosis.RCA:   --  Mid RCA: There was a tubular 20 % stenosis.  S/P DESX1 to proximal proximal LAD,  DESX1 to mid LAD, DESX1 TO DIAG  Pt states that he is having night sweats , started in summer and alfo feeling very fatigued recently.  He  presents today x [facility] due to SOB and abnormal NST on 12/11/2024 showing apical ischemia. and pt symptomatic with night swaets and fatigue. Department of Cardiology                                                                  [facility]/[place] [address] [address]                                                            Telephone: [phone]. Fax:[phone]                                                    Post- Procedure Note: Left Heart Cardiac Catheterization       Narrative:   Patient now s/p left heart catheterization via RRA  approach (RRB in place), with Dr. LOCO, tolerated procedure well without complications. Arrived to recovery room NAD and hemodynamically stable, distal pulse +, neurovascular intact          PAST MEDICAL & SURGICAL HISTORY:  CAD (coronary artery disease)      Sleep apnea  on  cpap  setting  15      HLD (hyperlipidemia)      Skin cancer      Hiatal hernia      GERD (gastroesophageal reflux disease)  pharyngeal reflux, gastro-esophageal reflux  without esophagitis      Diverticulosis      Glaucoma  open angle      Detached retina, bilateral  repaired      Abnormal stress test      Asthma      Deviated septum  repaired surgically      History of cataract surgery      H/O left inguinal hernia repair      H/O umbilical hernia repair      H/O right knee surgery      S/P angioplasty with stent  LAD      History of tonsillectomy        Home Medications:  Albuterol (Eqv-ProAir HFA) 90 mcg/inh inhalation aerosol: 2 inhaled every 4 hours as needed for prn (21 Jan 2025 14:20)  Arnuity Ellipta 100 mcg inhalation powder: 1 inhaled once a day (21 Jan 2025 14:15)  aspirin 81 mg oral tablet: 1 tab(s) orally once a day (21 Jan 2025 14:24)  Avodart 0.5 mg oral capsule: 1 cap(s) orally once a day (21 Jan 2025 14:24)  Crestor 40 mg oral tablet: 1 tab(s) orally once a day (at bedtime) (21 Jan 2025 14:24)  Flomax 0.4 mg oral capsule: 2 cap(s) orally once a day (21 Jan 2025 14:24)  levalbuterol 1.25 mg/3 mL inhalation solution: 3 milliliter(s) inhaled 2 times a day (21 Jan 2025 14:24)  men&#x27;s one a day multivitamin: 1 tab(s) orally once a day (21 Jan 2025 14:24)  Probiotic Formula oral capsule: 1 cap(s) orally once a day (21 Jan 2025 14:24)  Spiriva Respimat 1.25 mcg/inh inhalation aerosol: 2 inhaled once a day (21 Jan 2025 14:22)  vitamin b complex: 1 tab(s) orally once a day (21 Jan 2025 14:24)  Vitamin C 500 mg oral tablet: 1 tab(s) orally once a day (21 Jan 2025 14:24)  Vitamin D3 2000 intl units oral tablet: 1 tab(s) orally once a day (21 Jan 2025 14:24)  Xyzal 5 mg oral tablet: 1 tab(s) orally once a day (in the evening) (21 Jan 2025 14:24)        sulfa drugs (Rash)      Objective:  Vital Signs Last 24 Hrs  T(C): 36.5 (21 Jan 2025 11:40), Max: 36.5 (21 Jan 2025 11:40)  T(F): 97.7 (21 Jan 2025 11:40), Max: 97.7 (21 Jan 2025 11:40)  HR: 78 (21 Jan 2025 14:27) (75 - 80)  BP: 125/77 (21 Jan 2025 14:27) (122/75 - 136/68)  BP(mean): --  RR: 12 (21 Jan 2025 14:27) (12 - 14)  SpO2: 95% (21 Jan 2025 14:27) (95% - 97%)    Parameters below as of 21 Jan 2025 14:27  Patient On (Oxygen Delivery Method): room air        GENERAL: Pt lying comfortably, NAD.  ENMT: PERRL, +EOMI.  NECK: soft, Supple, No JVD,   CHEST/LUNG: Clear to auscultatation bilaterally; No wheezing.  HEART: S1S2+, Regular rate and rhythm; No murmurs.  ABDOMEN: Soft, Nontender, Nondistended; Bowel sounds present.  MUSCULOSKELETAL: Normal range of motion.  SKIN: No rashes or lesions.  NEURO: AAOX3, no focal deficits, no motor r sensory loss.  PSYCH: normal mood.  Procedure site: RRA  no signs of bleeding or hematoma, neurovascular intact   VASCULAR:   Radial +2 R/+2 L  Femoral +2 R/+2 L  PT +2 R/+2 L  DP +2 R/+2 L                          14.9   8.16  )-----------( 171      ( 21 Jan 2025 11:50 )             43.3     01-21    143  |  102  |  21.9[H]  ----------------------------<  100[H]  4.0   |  25.0  |  1.17    Ca    9.2      21 Jan 2025 11:50  Mg     1.8     01-21    TPro  6.8  /  Alb  3.9  /  TBili  0.4  /  DBili  x   /  AST  30  /  ALT  21  /  AlkPhos  73  01-21    Patient now s/p left heart catheterization via RRA  approach (RRB in place), with Dr. LOCO, tolerated procedure well without complications. Arrived to recovery room NAD and hemodynamically stable      Intraprocedurally:  Patient received  IV Fentanyl: 50 mcg  IV MIDAZOLAM: 1mg ivp  IV Heparin: 4,000 units   Verapamil: 2.5mg IA    Omnipaque: 84 ML      Prelim cath Findings:   S/P LHC VIA RRA  Prior stents patent  No obstructive CAD  Official cath report pending    # S/P LHC / PATENT STENTS    -post cardiac cath management per protocol  -Right radial  precautions  -bedrest x 1 hour  post procedure whie RRB in place  -NS 0.9% 250ml/hr x 1 bolus: post procedure ARMOND ppx   -continue current medical therapy  -C/W ASPIRIN  -statin therapy ; c/w Crestor  -follow up outpt in 2 weeks with Cardiologist DR ASHLEY  -[facility] following during hospitalization  -Lifestyle modifications discussed to reduce cardiovascular risk factors including weight reduction, smoking cessation, medication compliance, and routine follow up with Cardiologist to track your BMI, cholesterol, and glucose levels.   - Patient is not a candidate for cardiac rehab sec to diagnostic cath   -Discharge ipt at 04: 30 pm once criteria met  -Discussed with DR ASHLEY                                                            75 yo M with PMHx asthma, COPD, (on CPAP), carotid stenosis disorder, Meloma, obesity, PAD, adrenal cancer, DM, malignant spindle cell neoplasm, pancreatitis, HTN, HLD and CAD , s/p stents in LAD and Diag, in 2005 and 2008   PAD stent 2005 and again in 2008 after occlusion.    Repeat cath in 2017 revealed  LM:   --  LM: Normal., LAD:   --  Proximal LAD: There was a tubular 70 % stenosis.,   Mid LAD: There was a tubular 100 % stenosis at the site of a prior  stent.,  D1: There was a 90 % stenosis.CX:   --  Ostial circumflex: There was a discrete 50 % stenosis. RI:   --  Proximal ramus intermedius: There was a tubular 25 % stenosis.RCA:   --  Mid RCA: There was a tubular 20 % stenosis.  S/P DESX1 to proximal proximal LAD,  DESX1 to mid LAD, DESX1 TO DIAG  Pt states that he is having night sweats , started in summer and alfo feeling very fatigued recently.  He  presents today x [facility] due to SOB and abnormal NST on 12/11/2024 showing apical ischemia. and pt symptomatic with night swaets and fatigue. Department of Cardiology                                                                  [facility]/[place] [address] [address]                                                            Telephone: [phone]. Fax:[phone]                                                    Post- Procedure Note: Left Heart Cardiac Catheterization       Narrative:   Patient now s/p left heart catheterization via RRA  approach (RRB in place), with Dr. LOCO, tolerated procedure well without complications. Arrived to recovery room NAD and hemodynamically stable, distal pulse +, neurovascular intact          PAST MEDICAL & SURGICAL HISTORY:  CAD (coronary artery disease)      Sleep apnea  on  cpap  setting  15      HLD (hyperlipidemia)      Skin cancer      Hiatal hernia      GERD (gastroesophageal reflux disease)  pharyngeal reflux, gastro-esophageal reflux  without esophagitis      Diverticulosis      Glaucoma  open angle      Detached retina, bilateral  repaired      Abnormal stress test      Asthma      Deviated septum  repaired surgically      History of cataract surgery      H/O left inguinal hernia repair      H/O umbilical hernia repair      H/O right knee surgery      S/P angioplasty with stent  LAD      History of tonsillectomy        Home Medications:  Albuterol (Eqv-ProAir HFA) 90 mcg/inh inhalation aerosol: 2 inhaled every 4 hours as needed for prn (21 Jan 2025 14:20)  Arnuity Ellipta 100 mcg inhalation powder: 1 inhaled once a day (21 Jan 2025 14:15)  aspirin 81 mg oral tablet: 1 tab(s) orally once a day (21 Jan 2025 14:24)  Avodart 0.5 mg oral capsule: 1 cap(s) orally once a day (21 Jan 2025 14:24)  Crestor 40 mg oral tablet: 1 tab(s) orally once a day (at bedtime) (21 Jan 2025 14:24)  Flomax 0.4 mg oral capsule: 2 cap(s) orally once a day (21 Jan 2025 14:24)  levalbuterol 1.25 mg/3 mL inhalation solution: 3 milliliter(s) inhaled 2 times a day (21 Jan 2025 14:24)  men&#x27;s one a day multivitamin: 1 tab(s) orally once a day (21 Jan 2025 14:24)  Probiotic Formula oral capsule: 1 cap(s) orally once a day (21 Jan 2025 14:24)  Spiriva Respimat 1.25 mcg/inh inhalation aerosol: 2 inhaled once a day (21 Jan 2025 14:22)  vitamin b complex: 1 tab(s) orally once a day (21 Jan 2025 14:24)  Vitamin C 500 mg oral tablet: 1 tab(s) orally once a day (21 Jan 2025 14:24)  Vitamin D3 2000 intl units oral tablet: 1 tab(s) orally once a day (21 Jan 2025 14:24)  Xyzal 5 mg oral tablet: 1 tab(s) orally once a day (in the evening) (21 Jan 2025 14:24)        sulfa drugs (Rash)      Objective:  Vital Signs Last 24 Hrs  T(C): 36.5 (21 Jan 2025 11:40), Max: 36.5 (21 Jan 2025 11:40)  T(F): 97.7 (21 Jan 2025 11:40), Max: 97.7 (21 Jan 2025 11:40)  HR: 78 (21 Jan 2025 14:27) (75 - 80)  BP: 125/77 (21 Jan 2025 14:27) (122/75 - 136/68)  BP(mean): --  RR: 12 (21 Jan 2025 14:27) (12 - 14)  SpO2: 95% (21 Jan 2025 14:27) (95% - 97%)    Parameters below as of 21 Jan 2025 14:27  Patient On (Oxygen Delivery Method): room air        GENERAL: Pt lying comfortably, NAD.  ENMT: PERRL, +EOMI.  NECK: soft, Supple, No JVD,   CHEST/LUNG: Clear to auscultatation bilaterally; No wheezing.  HEART: S1S2+, Regular rate and rhythm; No murmurs.  ABDOMEN: Soft, Nontender, Nondistended; Bowel sounds present.  MUSCULOSKELETAL: Normal range of motion.  SKIN: No rashes or lesions.  NEURO: AAOX3, no focal deficits, no motor r sensory loss.  PSYCH: normal mood.  Procedure site: RRA  no signs of bleeding or hematoma, neurovascular intact   VASCULAR:   Radial +2 R/+2 L  Femoral +2 R/+2 L  PT +2 R/+2 L  DP +2 R/+2 L                          14.9   8.16  )-----------( 171      ( 21 Jan 2025 11:50 )             43.3     01-21    143  |  102  |  21.9[H]  ----------------------------<  100[H]  4.0   |  25.0  |  1.17    Ca    9.2      21 Jan 2025 11:50  Mg     1.8     01-21    TPro  6.8  /  Alb  3.9  /  TBili  0.4  /  DBili  x   /  AST  30  /  ALT  21  /  AlkPhos  73  01-21      73 yo M with PMHx asthma, COPD, (on CPAP), carotid stenosis disorder, Melanoma, obesity, PAD, adrenal cancer, DM, malignant spindle cell neoplasm, pancreatitis, HTN, HLD and CAD , s/p stents in LAD and Diag, in 2005 and 2008   PAD stent 2005 and again in 2008 after occlusion.    Repeat cath in 2017 revealed  LM:   --  LM: Normal., LAD:   --  Proximal LAD: There was a tubular 70 % stenosis.,   Mid LAD: There was a tubular 100 % stenosis at the site of a prior  stent.,  D1: There was a 90 % stenosis.CX:   --  Ostial circumflex: There was a discrete 50 % stenosis. RI:   --  Proximal ramus intermedius: There was a tubular 25 % stenosis.RCA:   --  Mid RCA: There was a tubular 20 % stenosis.  S/P DESX1 to proximal proximal LAD,  DESX1 to mid LAD, DESX1 TO DIAG  Pt states that he is having night sweats , started in summer and alfo feeling very fatigued recently.  He  presents today x [facility] due to SOB and abnormal NST on 12/11/2024 showing apical ischemia. and pt symptomatic with night sweats and fatigue.    Patient now s/p left heart catheterization via RRA  approach (RRB in place), with Dr. LOCO, tolerated procedure well without complications. Arrived to recovery room NAD and hemodynamically stable      Intraprocedurally:  Patient received  IV Fentanyl: 50 mcg  IV MIDAZOLAM: 1mg ivp  IV Heparin: 4,000 units   Verapamil: 2.5mg IA    Omnipaque: 84 ML      Prelim cath Findings:   S/P LHC VIA RRA  Prior stents patent  No obstructive CAD  Official cath report pending    # S/P LHC / PATENT STENTS    -post cardiac cath management per protocol  -Right radial  precautions  -bedrest x 1 hour  post procedure whie RRB in place  -NS 0.9% 250ml/hr x 1 bolus: post procedure ARMOND ppx   -continue current medical therapy  -C/W ASPIRIN  -statin therapy ; c/w Crestor  -follow up outpt in 2 weeks with Cardiologist DR ASHLEY  -[facility] following during hospitalization  -Lifestyle modifications discussed to reduce cardiovascular risk factors including weight reduction, smoking cessation, medication compliance, and routine follow up with Cardiologist to track your BMI, cholesterol, and glucose levels.   - Patient is not a candidate for cardiac rehab sec to diagnostic cath   -Discharge ipt at 04: 30 pm once criteria met  -Discussed with DR ASHLEY Department of Cardiology                                                                  [facility]/[place] [address] [address]                                                            Telephone: [phone]. Fax:[phone]                                                    Post- Procedure Note: Left Heart Cardiac Catheterization       Narrative:   Patient now s/p left heart catheterization via RRA  approach (RRB in place), with Dr. LOCO, tolerated procedure well without complications. Arrived to recovery room NAD and hemodynamically stable, distal pulse +, neurovascular intact          PAST MEDICAL & SURGICAL HISTORY:  CAD (coronary artery disease)      Sleep apnea  on  cpap  setting  15      HLD (hyperlipidemia)      Skin cancer      Hiatal hernia      GERD (gastroesophageal reflux disease)  pharyngeal reflux, gastro-esophageal reflux  without esophagitis      Diverticulosis      Glaucoma  open angle      Detached retina, bilateral  repaired      Abnormal stress test      Asthma      Deviated septum  repaired surgically      History of cataract surgery      H/O left inguinal hernia repair      H/O umbilical hernia repair      H/O right knee surgery      S/P angioplasty with stent  LAD      History of tonsillectomy        Home Medications:  Albuterol (Eqv-ProAir HFA) 90 mcg/inh inhalation aerosol: 2 inhaled every 4 hours as needed for prn (21 Jan 2025 14:20)  Arnuity Ellipta 100 mcg inhalation powder: 1 inhaled once a day (21 Jan 2025 14:15)  aspirin 81 mg oral tablet: 1 tab(s) orally once a day (21 Jan 2025 14:24)  Avodart 0.5 mg oral capsule: 1 cap(s) orally once a day (21 Jan 2025 14:24)  Crestor 40 mg oral tablet: 1 tab(s) orally once a day (at bedtime) (21 Jan 2025 14:24)  Flomax 0.4 mg oral capsule: 2 cap(s) orally once a day (21 Jan 2025 14:24)  levalbuterol 1.25 mg/3 mL inhalation solution: 3 milliliter(s) inhaled 2 times a day (21 Jan 2025 14:24)  men&#x27;s one a day multivitamin: 1 tab(s) orally once a day (21 Jan 2025 14:24)  Probiotic Formula oral capsule: 1 cap(s) orally once a day (21 Jan 2025 14:24)  Spiriva Respimat 1.25 mcg/inh inhalation aerosol: 2 inhaled once a day (21 Jan 2025 14:22)  vitamin b complex: 1 tab(s) orally once a day (21 Jan 2025 14:24)  Vitamin C 500 mg oral tablet: 1 tab(s) orally once a day (21 Jan 2025 14:24)  Vitamin D3 2000 intl units oral tablet: 1 tab(s) orally once a day (21 Jan 2025 14:24)  Xyzal 5 mg oral tablet: 1 tab(s) orally once a day (in the evening) (21 Jan 2025 14:24)        sulfa drugs (Rash)      Objective:  Vital Signs Last 24 Hrs  T(C): 36.5 (21 Jan 2025 11:40), Max: 36.5 (21 Jan 2025 11:40)  T(F): 97.7 (21 Jan 2025 11:40), Max: 97.7 (21 Jan 2025 11:40)  HR: 78 (21 Jan 2025 14:27) (75 - 80)  BP: 125/77 (21 Jan 2025 14:27) (122/75 - 136/68)  BP(mean): --  RR: 12 (21 Jan 2025 14:27) (12 - 14)  SpO2: 95% (21 Jan 2025 14:27) (95% - 97%)    Parameters below as of 21 Jan 2025 14:27  Patient On (Oxygen Delivery Method): room air        GENERAL: Pt lying comfortably, NAD.  ENMT: PERRL, +EOMI.  NECK: soft, Supple, No JVD,   CHEST/LUNG: Clear to auscultatation bilaterally; No wheezing.  HEART: S1S2+, Regular rate and rhythm; No murmurs.  ABDOMEN: Soft, Nontender, Nondistended; Bowel sounds present.  MUSCULOSKELETAL: Normal range of motion.  SKIN: No rashes or lesions.  NEURO: AAOX3, no focal deficits, no motor r sensory loss.  PSYCH: normal mood.  Procedure site: RRA  no signs of bleeding or hematoma, neurovascular intact   VASCULAR:   Radial +2 R/+2 L  Femoral +2 R/+2 L  PT +2 R/+2 L  DP +2 R/+2 L                          14.9   8.16  )-----------( 171      ( 21 Jan 2025 11:50 )             43.3     01-21    143  |  102  |  21.9[H]  ----------------------------<  100[H]  4.0   |  25.0  |  1.17    Ca    9.2      21 Jan 2025 11:50  Mg     1.8     01-21    TPro  6.8  /  Alb  3.9  /  TBili  0.4  /  DBili  x   /  AST  30  /  ALT  21  /  AlkPhos  73  01-21      73 yo M with PMHx asthma, COPD, (on CPAP), carotid stenosis disorder, Melanoma, obesity, PAD, adrenal cancer, DM, malignant spindle cell neoplasm, pancreatitis, HTN, HLD and CAD , s/p stents in LAD and Diag, in 2005 and 2008   PAD stent 2005 and again in 2008 after occlusion.    Repeat cath in 2017 revealed  LM:   --  LM: Normal., LAD:   --  Proximal LAD: There was a tubular 70 % stenosis.,   Mid LAD: There was a tubular 100 % stenosis at the site of a prior  stent.,  D1: There was a 90 % stenosis.CX:   --  Ostial circumflex: There was a discrete 50 % stenosis. RI:   --  Proximal ramus intermedius: There was a tubular 25 % stenosis.RCA:   --  Mid RCA: There was a tubular 20 % stenosis.  S/P DESX1 to proximal proximal LAD,  DESX1 to mid LAD, DESX1 TO DIAG  Pt states that he is having night sweats , started in summer and alfo feeling very fatigued recently.  He  presents today x [facility] due to his ischemia symptoms,  and abnormal NST on 12/11/2024 showing apical ischemia.    Patient now s/p left heart catheterization via RRA  approach (RRB in place), with Dr. Navarrete, tolerated procedure well without complications. Arrived to recovery room NAD and hemodynamically stable      Intraprocedurally:  Patient received  IV Fentanyl: 50 mcg  IV MIDAZOLAM: 1mg ivp  IV Heparin: 4,000 units   Verapamil: 2.5mg IA    Omnipaque: 84 ML      Prelim cath Findings:   S/P LHC VIA RRA  Prior stents patent  No obstructive CAD  Official cath report pending    # S/P LHC / PATENT STENTS    -post cardiac cath management per protocol  -Right radial  precautions  -bedrest x 1 hour  post procedure whie RRB in place  -NS 0.9% 250ml/hr x 1 bolus: post procedure ARMOND ppx   -continue current medical therapy  -C/W ASPIRIN  -statin therapy ; c/w Crestor  -follow up outpt in 2 weeks with Cardiologist DR NAVARRETE  -[facility] following during hospitalization  -Lifestyle modifications discussed to reduce cardiovascular risk factors including weight reduction, smoking cessation, medication compliance, and routine follow up with Cardiologist to track your BMI, cholesterol, and glucose levels.   - Patient is not a candidate for cardiac rehab sec to diagnostic cath   -Discharge ipt at 04: 30 pm once criteria met  -Discussed with DR NAVARRETE

## 2025-01-21 NOTE — DISCHARGE NOTE NURSING/CASE MANAGEMENT/SOCIAL WORK - PATIENT PORTAL LINK FT
You can access the FollowMyHealth Patient Portal offered by [facility] by registering at the following website: [email or web address]. By joining [employer]’s FollowMyHealth portal, you will also be able to view your health information using other applications (apps) compatible with our system.

## 2025-01-21 NOTE — DISCHARGE NOTE PROVIDER - HOSPITAL COURSE
Brief hospital course       75 yo M with PMHx asthma, COPD, (on CPAP), carotid stenosis disorder, Melanoma, obesity, PAD, adrenal cancer, DM, malignant spindle cell neoplasm, pancreatitis, HTN, HLD and CAD , s/p stents in LAD and Diag, in 2005 and 2008   PAD stent 2005 and again in 2008 after occlusion.    Repeat cath in 2017 revealed  LM:   --  LM: Normal., LAD:   --  Proximal LAD: There was a tubular 70 % stenosis.,   Mid LAD: There was a tubular 100 % stenosis at the site of a prior  stent.,  D1: There was a 90 % stenosis.CX:   --  Ostial circumflex: There was a discrete 50 % stenosis. RI:   --  Proximal ramus intermedius: There was a tubular 25 % stenosis.RCA:   --  Mid RCA: There was a tubular 20 % stenosis.  S/P DESX1 to proximal proximal LAD,  DESX1 to mid LAD, DESX1 TO DIAG  Pt states that he is having night sweats , started in summer and alfo feeling very fatigued recently.  He  presented today to [facility]  for LHC due to his ischemic symptoms  and abnormal NST on 12/11/2024 showing apical ischemia    Patient now s/p left heart catheterization via RRA  approach with Dr. ASHLEY,  tolerated procedure well without complications. Arrived to recovery room NAD and hemodynamically stable      Intraprocedurally:  Patient received  IV Fentanyl: 50 mcg  IV MIDAZOLAM: 1mg ivp  IV Heparin: 4,000 units   Verapamil: 2.5mg IA    Omnipaque: 84 ML      Prelim cath Findings:   S/P LHC VIA RRA  Prior stents patent  No obstructive CAD  Official cath report pending    # S/P LHC / PATENT STENTS    -post cardiac cath management per protocol  -Right radial  precautions  -NS 0.9% 250ml/hr x 1 bolus: post procedure ARMOND ppx   -continue current medical therapy  -C/W ASPIRIN 18mg po daily  -statin therapy ; c/w Crestor  -follow up outpt in 2 weeks with Cardiologist DR ASHLEY  -[facility] following during hospitalization  -Lifestyle modifications discussed to reduce cardiovascular risk factors including weight reduction, smoking cessation, medication compliance, and routine follow up with Cardiologist to track your BMI, cholesterol, and glucose levels.   - Patient is not a candidate for cardiac rehab sec to diagnostic cath   -Discharge pt at 04: 30 pm once criteria met  -Discussed with DR ASHLEY.

## 2025-01-21 NOTE — DISCHARGE NOTE NURSING/CASE MANAGEMENT/SOCIAL WORK - FINANCIAL ASSISTANCE
[facility] provides services at a reduced cost to those who are determined to be eligible through [facility]’s financial assistance program. Information regarding [facility]’s financial assistance program can be found by going to [email or web address] or by calling [phone].

## 2025-02-11 NOTE — H&P PST ADULT - GASTROINTESTINAL
CICU DAILY GOALS       A: Awake    RASS: Goal -    Actual - RASS (Ng Agitation-Sedation Scale): alert and calm   Restraint necessity:    B: Breath   SBT: Not intubated   C: Coordinate A & B, analgesics/sedatives   Pain: managed    SAT: Not intubated  D: Delirium   CAM-ICU:    E: Early(intubated/ Progressive (non-intubated) Mobility   MOVE Screen: Pass   Activity: Activity Management: Patient unable to perform activities  FAS: Feeding/Nutrition   Diet order: Diet/Nutrition Received: NPO,   Fluid restriction:     Nutritional Supplement Intake: Quantity , Type:   T: Thrombus   DVT prophylaxis:    H: HOB Elevation   Head of Bed (HOB) Positioning: HOB elevated, HOB at 20-30 degrees  U: Ulcer Prophylaxis   GI: no  G: Glucose control   managed    S: Skin      Wounds: No  Wound care consulted: No  B: Bowel Function   no issues   I: Indwelling Catheters   Hess necessity:     CVC necessity: No  D: De-escalation Antibx   Yes  Plan for the day   Continuing to monitor s/p TAVR  Family/Goals of care/Code Status   Code Status: Prior     No acute events throughout day, VS and assessment per flow sheet, patient progressing towards goals as tolerated, plan of care reviewed with Ro Hernandez and family, all concerns addressed, will continue to monitor.    negative Soft, non-tender, no hepatosplenomegaly, normal bowel sounds

## 2025-05-13 ENCOUNTER — APPOINTMENT (OUTPATIENT)
Dept: PEDIATRIC ALLERGY IMMUNOLOGY | Facility: CLINIC | Age: 75
End: 2025-05-13
Payer: MEDICARE

## 2025-05-13 ENCOUNTER — OFFICE (OUTPATIENT)
Dept: URBAN - METROPOLITAN AREA CLINIC 105 | Facility: CLINIC | Age: 75
Setting detail: OPHTHALMOLOGY
End: 2025-05-13
Payer: MEDICARE

## 2025-05-13 VITALS — OXYGEN SATURATION: 95 % | DIASTOLIC BLOOD PRESSURE: 65 MMHG | HEART RATE: 76 BPM | SYSTOLIC BLOOD PRESSURE: 140 MMHG

## 2025-05-13 DIAGNOSIS — J30.89 OTHER ALLERGIC RHINITIS: ICD-10-CM

## 2025-05-13 DIAGNOSIS — H52.4: ICD-10-CM

## 2025-05-13 DIAGNOSIS — H40.1112: ICD-10-CM

## 2025-05-13 DIAGNOSIS — H11.153: ICD-10-CM

## 2025-05-13 DIAGNOSIS — J45.40 MODERATE PERSISTENT ASTHMA, UNCOMPLICATED: ICD-10-CM

## 2025-05-13 DIAGNOSIS — H40.1121: ICD-10-CM

## 2025-05-13 DIAGNOSIS — H35.371: ICD-10-CM

## 2025-05-13 DIAGNOSIS — J30.1 ALLERGIC RHINITIS DUE TO POLLEN: ICD-10-CM

## 2025-05-13 PROCEDURE — 92250 FUNDUS PHOTOGRAPHY W/I&R: CPT | Performed by: OPTOMETRIST

## 2025-05-13 PROCEDURE — 92015 DETERMINE REFRACTIVE STATE: CPT | Performed by: OPTOMETRIST

## 2025-05-13 PROCEDURE — 92014 COMPRE OPH EXAM EST PT 1/>: CPT | Performed by: OPTOMETRIST

## 2025-05-13 PROCEDURE — 99213 OFFICE O/P EST LOW 20 MIN: CPT

## 2025-05-13 ASSESSMENT — KERATOMETRY
OD_K2POWER_DIOPTERS: 45.50
OD_K1POWER_DIOPTERS: 44.75
OD_AXISANGLE_DEGREES: 083
OS_K2POWER_DIOPTERS: 45.00
OS_K1POWER_DIOPTERS: 43.75
OS_AXISANGLE_DEGREES: 008

## 2025-05-13 ASSESSMENT — REFRACTION_MANIFEST
OS_VA1: 20/20
OS_ADD: +2.50
OS_SPHERE: +0.50
OS_CYLINDER: -1.50
OD_AXIS: 075
OD_VA1: 20/20
OD_ADD: +2.50
OD_CYLINDER: -0.75
OS_AXIS: 100
OD_SPHERE: +1.00

## 2025-05-13 ASSESSMENT — REFRACTION_CURRENTRX
OD_AXIS: 111
OD_OVR_VA: 20/
OD_CYLINDER: -0.25
OS_SPHERE: PLANO
OS_VPRISM_DIRECTION: PROGS
OD_SPHERE: +1.50
OS_OVR_VA: 20/
OS_AXIS: 086
OS_CYLINDER: -1.00
OD_ADD: +3.00
OS_ADD: +3.00
OD_VPRISM_DIRECTION: PROGS

## 2025-05-13 ASSESSMENT — TONOMETRY
OD_IOP_MMHG: 13
OD_IOP_MMHG: 14
OS_IOP_MMHG: 12
OS_IOP_MMHG: 14

## 2025-05-13 ASSESSMENT — PACHYMETRY
OS_CT_CORRECTION: 3
OS_CT_UM: 500
OD_CT_UM: 490
OD_CT_CORRECTION: 4

## 2025-05-13 ASSESSMENT — REFRACTION_AUTOREFRACTION
OD_SPHERE: +0.50
OS_AXIS: 091
OD_AXIS: 074
OS_SPHERE: PLANO
OD_CYLINDER: -0.25
OS_CYLINDER: -1.75

## 2025-05-13 ASSESSMENT — CONFRONTATIONAL VISUAL FIELD TEST (CVF)
OD_FINDINGS: FULL
OS_FINDINGS: FULL

## 2025-05-13 ASSESSMENT — VISUAL ACUITY
OS_BCVA: 20/25-2
OD_BCVA: 20/20